# Patient Record
Sex: FEMALE | Race: OTHER | NOT HISPANIC OR LATINO | Employment: FULL TIME | URBAN - METROPOLITAN AREA
[De-identification: names, ages, dates, MRNs, and addresses within clinical notes are randomized per-mention and may not be internally consistent; named-entity substitution may affect disease eponyms.]

---

## 2017-01-17 LAB
T4 FREE SERPL-MCNC: 1.72 NG/DL (ref 0.82–1.77)
THYROGLOB AB SERPL-ACNC: <1 IU/ML
THYROGLOB AB SERPL-ACNC: <1 IU/ML (ref 0–0.9)
THYROGLOB SERPL-MCNC: 0.1 NG/ML
THYROGLOB SERPL-MCNC: NORMAL NG/ML
TSH SERPL DL<=0.005 MIU/L-ACNC: 1.08 UIU/ML (ref 0.45–4.5)

## 2017-02-06 RX ORDER — OMEPRAZOLE 20 MG/1
CAPSULE, DELAYED RELEASE ORAL
Qty: 30 CAP | Refills: 0 | Status: SHIPPED | OUTPATIENT
Start: 2017-02-06 | End: 2022-01-12

## 2017-11-09 LAB
T4 FREE SERPL-MCNC: 1.79 NG/DL (ref 0.82–1.77)
THYROGLOB AB SERPL-ACNC: <1 IU/ML (ref 0–0.9)
THYROGLOB SERPL-MCNC: 0.2 NG/ML (ref 1.5–38.5)
TSH SERPL DL<=0.005 MIU/L-ACNC: 3.58 UIU/ML (ref 0.45–4.5)

## 2019-02-19 ENCOUNTER — OCCUPATIONAL MEDICINE (OUTPATIENT)
Dept: URGENT CARE | Facility: CLINIC | Age: 54
End: 2019-02-19
Payer: OTHER MISCELLANEOUS

## 2019-02-19 VITALS
WEIGHT: 138 LBS | SYSTOLIC BLOOD PRESSURE: 112 MMHG | TEMPERATURE: 99 F | RESPIRATION RATE: 16 BRPM | BODY MASS INDEX: 25.4 KG/M2 | DIASTOLIC BLOOD PRESSURE: 74 MMHG | OXYGEN SATURATION: 98 % | HEIGHT: 62 IN | HEART RATE: 60 BPM

## 2019-02-19 DIAGNOSIS — S09.90XA TRAUMATIC INJURY OF HEAD, INITIAL ENCOUNTER: ICD-10-CM

## 2019-02-19 PROCEDURE — 99213 OFFICE O/P EST LOW 20 MIN: CPT | Mod: 29 | Performed by: PHYSICIAN ASSISTANT

## 2019-02-19 NOTE — LETTER
"EMPLOYEE’S CLAIM FOR COMPENSATION/ REPORT OF INITIAL TREATMENT  FORM C-4    EMPLOYEE’S CLAIM - PROVIDE ALL INFORMATION REQUESTED   First Name  Carlos Last Name  Nikky Birthdate                    1965                Sex  female Claim Number   Home Address  [address] Age  53 y.o. Height  1.562 m (5' 1.5\") Weight  62.6 kg (138 lb) [facility]     [facility] Zip  [address] Telephone  [phone] (home)    Mailing Address  [address] [facility] Zip  [address] Primary Language Spoken  English    Insurer  Unknown Third Party   [employer]   Employee's Occupation (Job Title) When Injury or Occupational Disease Occurred      Employer's Name  [employer]  Telephone  [phone]    Employer Address  [address]  Zip  [address]    Date of Injury  2/19/2019               Hour of Injury  8:20 AM Date Employer Notified  2/19/2019 Last Day of Work after Injury or Occupational Disease  2/19/2019 Supervisor to Whom Injury Reported  Shannon   Address or Location of Accident (if applicable)  [[address]]   What were you doing at the time of accident? (if applicable)  Scanning boxes from pallet    How did this injury or occupational disease occur? (Be specific an answer in detail. Use additional sheet if necessary)  While unwrapping a pallet full of boxes, a box weighing approximatey 20lbs fell on my head.   If you believe that you have an occupational disease, when did you first have knowledge of the disability and it relationship to your employment?  na Witnesses to the Accident  Mikhail Lucas Christian, Sandra      Nature of Injury or Occupational Disease  Defer  Part(s) of Body Injured or Affected  Skull, ,     I certify that the above is true and correct to the best of my knowledge and that I have provided this information in order to obtain the benefits of [place]’s " [place]  I hereby authorize any physician, chiropractor, surgeon, practitioner, or other person, any hospital, including [facility] or [facility], any medical service organization, any insurance company, or other institution or organization to release to each other, any medical or other information, including benefits paid or payable, pertinent to this injury or disease, except information relative to diagnosis, treatment and/or counseling for AIDS, psychological conditions, alcohol or controlled substances, for which I must give specific authorization.  A Photostat of this authorization shall be as valid as the original.     Date   Place   Employee’s Signature   THIS REPORT MUST BE COMPLETED AND MAILED WITHIN 3 WORKING DAYS OF TREATMENT   Place  [facility]  Name of Facility  [facility]   Date  2/19/2019 Diagnosis  (S09.90XA) Traumatic injury of head, initial encounter Is there evidence the injured employee was under the influence of alcohol and/or another controlled substance at the time of accident?   Hour  11:34 AM Description of Injury or Disease  The encounter diagnosis was Traumatic injury of head, initial encounter. No   Treatment  Head injury, no loss of consciousness.  Neuro exam normal, vital signs normal, no red flag symptoms.  OTC meds and conservative measures as discussed  Follow-up 1 week, sooner if new symptoms arise  Have you advised the patient to remain off work five days or more? No   X-Ray Findings      If Yes   From Date  To Date      From information given by the employee, together with medical evidence, can you directly connect this injury or occupational disease as job incurred?  Yes If No Full Duty  No Modified Duty  Yes   Is additional medical care by a physician indicated?  Yes If Modified Duty, Specify any Limitations / Restrictions  Weight limit 25 pounds    "  Do you know of any previous injury or disease contributing to this condition or occupational disease?                            No   Date  2/19/2019 Print Doctor’s Name Manjinder Wilde P.A.-C. I certify the employer’s copy of  this form was mailed on:   Address  [address]s [address] Only     [place] Zip  [address]    Provider’s Tax ID Number  [id number] Telephone  Dept: [phone]        ace-MANJINDER Alegria P.A.-C.   e-Signature: Dr. Kyle Steinberg, Medical Director Degree  CRISTIAN        ORIGINAL-TREATING PHYSICIAN OR CHIROPRACTOR    PAGE 2-INSURER/TPA    PAGE 3-EMPLOYER    PAGE 4-EMPLOYEE             Form C-4 (rev10/07)              BRIEF DESCRIPTION OF RIGHTS AND BENEFITS  (Pursuant to [place]RS 616C.050)    Notice of Injury or Occupational Disease (Incident Report Form C-1): If an injury or occupational disease (OD) arises out of and in the  course of employment, you must provide written notice to your employer as soon as practicable, but no later than 7 days after the accident or  OD. Your employer shall maintain a sufficient supply of the required forms.    Claim for Compensation (Form C-4): If medical treatment is sought, the form C-4 is available at the place of initial treatment. A completed  \"Claim for Compensation\" (Form C-4) must be filed within 90 days after an accident or OD. The treating physician or chiropractor must,  within 3 working days after treatment, complete and mail to the employer, the employer's insurer and third-party , the Claim for  Compensation.    Medical Treatment: If you require medical treatment for your on-the-job injury or OD, you may be required to select a physician or  chiropractor from a list provided by your workers’ compensation insurer, if it has contracted with an Organization for Managed Care (MCO) or  Preferred Provider Organization (PPO) or providers of health care. If your employer has not entered into a contract with an MCO or " PPO, you  may select a physician or chiropractor from the Panel of Physicians and Chiropractors. Any medical costs related to your industrial injury or  OD will be paid by your insurer.    Temporary Total Disability (TTD): If your doctor has certified that you are unable to work for a period of at least 5 consecutive days, or 5  cumulative days in a 20-day period, or places restrictions on you that your employer does not accommodate, you may be entitled to TTD  compensation.    Temporary Partial Disability (TPD): If the wage you receive upon reemployment is less than the compensation for TTD to which you are  entitled, the insurer may be required to pay you TPD compensation to make up the difference. TPD can only be paid for a maximum of 24  months.    Permanent Partial Disability (PPD): When your medical condition is stable and there is an indication of a PPD as a result of your injury or  OD, within 30 days, your insurer must arrange for an evaluation by a rating physician or chiropractor to determine the degree of your PPD. The  amount of your PPD award depends on the date of injury, the results of the PPD evaluation and your age and wage.    Permanent Total Disability (PTD): If you are medically certified by a treating physician or chiropractor as permanently and totally disabled  and have been granted a PTD status by your insurer, you are entitled to receive monthly benefits not to exceed 66 2/3% of your average  monthly wage. The amount of your PTD payments is subject to reduction if you previously received a PPD award.    Vocational Rehabilitation Services: You may be eligible for vocational rehabilitation services if you are unable to return to the job due to a  permanent physical impairment or permanent restrictions as a result of your injury or occupational disease.    Transportation and Per Umu Reimbursement: You may be eligible for travel expenses and per umu associated with medical  treatment.    Reopening: You may be able to reopen your claim if your condition worsens after claim closure.    Appeal Process: If you disagree with a written determination issued by the insurer or the insurer does not respond to your request, you may  appeal to the Department of Administration, , by following the instructions contained in your determination letter. You must  appeal the determination within 70 days from the date of the determination letter at [address], [address], [place], [place]  [address], or [address], [place], [place] [address]. If you disagree with the  decision, you may appeal to the  Department of Administration, . You must file your appeal within 30 days from the date of the  decision  letter at [address], [address], [place], [place] [address], or [address], [place], [place] [address]. If you  disagree with a decision of an , you may file a petition for judicial review with the District Court. You must do so within 30  days of the Appeal Officer’s decision. You may be represented by an  at your own expense or you may contact the [facility] for possible  representation.    [place]  for Injured Workers (NAIW): If you disagree with a  decision, you may request that NAIW represent you  without charge at an  Hearing. For information regarding denial of benefits, you may contact the [facility] at: [address], [address], [place] [address], [phone], or [address], [address], [place] [address], [phone]    To File a Complaint with the Division: If you wish to file a complaint with the  of the Division of Industrial Relations (DIR),  please contact the Workers’ Compensation Section, [address], [address], [place], [place] [address], telephone [phone]) [phone], or  [address], [address], [place], [place] [address], telephone [phone].    For assistance with Workers’ Compensation Issues: you may contact the Office of the Governor Consumer Health Assistance, [address] [place] [address], [address], [place], [place] [address], Toll Free 1-457.779.7556, Web site: http://[employer].[facility].nv., E-mail  Flori@[facility].nv.                                                                                                                                                                                                                                   __________________________________________________________________                                                                   _________________                Employee Name / Signature                                                                                                                                                       Date                                                                                                                                                                                                     D-2 (rev. 10/07)

## 2019-02-19 NOTE — PROGRESS NOTES
"Subjective:      Carlos Sher is a 53 y.o. female who presents with Head Injury (NEW WC DOI 2/19/2019. A box fell on her head.)      DOI: 2/19/19. Was unloading a pallet and a 10-15 lb box fell on top of head. Felt a tingling sensation into face, resolved. No LOC. Used ice, no medications. Feels some pressure on top of head. Denies vision changes, dizziness, numbness/tingling, no coordination issues, neck pain. No previous injuries. No 2nd job.     Head Injury          ROS       Objective:     /74 (BP Location: Left arm, Patient Position: Sitting, BP Cuff Size: Adult)   Pulse 60   Temp 37.2 °C (99 °F) (Temporal)   Resp 16   Ht 1.562 m (5' 1.5\")   Wt 62.6 kg (138 lb)   SpO2 98%   BMI 25.65 kg/m²      Physical Exam   Constitutional: She is oriented to person, place, and time. She appears well-developed and well-nourished. No distress.   HENT:   Head: Normocephalic and atraumatic.   Right Ear: External ear normal.   Left Ear: External ear normal.   Nose: Nose normal.   Eyes: Pupils are equal, round, and reactive to light. Conjunctivae and EOM are normal.   Neck: Normal range of motion. Neck supple.   Neurological: She is alert and oriented to person, place, and time. She has normal strength. She displays normal reflexes. No cranial nerve deficit or sensory deficit. She exhibits normal muscle tone. Coordination and gait normal.   Skin: Skin is warm and dry. She is not diaphoretic.   Psychiatric: She has a normal mood and affect. Her speech is normal and behavior is normal. Judgment and thought content normal. Cognition and memory are normal.   Nursing note and vitals reviewed.              Assessment/Plan:     1. Traumatic injury of head, initial encounter       No loss of consciousness.  Held headache otherwise asymptomatic.  No red flag symptoms.  Vital signs normal, complete neuro exam within normal limits.  No ataxia or coordination issues.  No memory loss.  Mild head injury.  Follow-up 1 week, return " immediately for any new or changing symptoms.  OTC meds and conservative measures as discussed    Return to clinic or go to ED if symptoms worsen or persist. Indications for ED discussed at length. Patient voices understanding. Red flags discussed.All side effects of medication discussed including allergic response, GI upset, tendon injury, etc.    Please note that this dictation was created using voice recognition software. I have made every reasonable attempt to correct obvious errors, but I expect that there are errors of grammar and possibly content that I did not discover before finalizing the note.

## 2019-02-19 NOTE — LETTER
[facility] [address] [place] [address]  Phone:  [phone] - Fax:  [phone]   [facility] Progress Report and Disability Certification  Date of Service: 2/19/2019   No Show:  No  Date / Time of Next Visit: 2/27/2019@11:00am   Claim Information   Patient Name: Carlos Sher  Claim Number:     Employer: [employer]  Date of Injury: 2/19/2019     Insurer / TPA: [employer]  ID / SSN:     Occupation:   Diagnosis: The encounter diagnosis was Traumatic injury of head, initial encounter.    Medical Information   Related to Industrial Injury? Yes    Subjective Complaints:  DOI: 2/19/19. Was unloading a pallet and a 10-15 lb box fell on top of head. Felt a tingling sensation into face, resolved. No LOC. Used ice, no medications. Feels some pressure on top of head. Denies nausea/vomiting, vision changes, dizziness, numbness/tingling, no coordination issues, neck pain. No previous injuries. No 2nd job.   Objective Findings: Constitutional: She is oriented to person, place, and time. She appears well-developed and well-nourished. No distress.   Head: Normocephalic and atraumatic no hematoma or abrasion seen.  TMs clear bilaterally, canal and external ear unchanged.  Nose: Nose normal. Eyes: Pupils are equal, round, and reactive to light. Conjunctivae and EOM are normal.   Neck: Normal range of motion. Neck supple.   Neurological: She is alert and oriented to person, place, and time. She has normal strength. She displays normal reflexes. No cranial nerve deficit or sensory deficit. She exhibits normal muscle tone. Coordination and gait normal.   Skin: Skin is warm and dry. She is not diaphoretic.   Psychiatric: She has a normal mood and affect. Her speech is normal and behavior is normal. Judgment and thought content normal. Cognition and memory are normal.   Nursing note and vitals reviewed.   Pre-Existing Condition(s): None   Assessment:   Initial  Visit    Status: Additional Care Required  Permanent Disability:No    Plan:   Comments:OTC as needed    Diagnostics:      Comments:       Disability Information   Status: Released to Restricted Duty    From:  2/19/2019  Through: 2/27/2019 Restrictions are: Temporary   Physical Restrictions   Sitting:    Standing:    Stooping:    Bending:      Squatting:    Walking:    Climbing:    Pushing:      Pulling:    Other:    Reaching Above Shoulder (L):   Reaching Above Shoulder (R):       Reaching Below Shoulder (L):    Reaching Below Shoulder (R):      Not to exceed Weight Limits   Carrying(hrs):   Weight Limit(lb): < or = to 25 pounds Lifting(hrs):   Weight  Limit(lb): < or = to 25 pounds   Comments: No heavy lifting    Repetitive Actions   Hands: i.e. Fine Manipulations from Grasping:     Feet: i.e. Operating Foot Controls:     Driving / Operate Machinery:     Physician Name: Manjinder Wilde P.A.-C. Physician Signature: MANJINDER Chacko P.A.-C. e-Signature: Dr. Kyle Steinberg, Medical Director   Clinic Name / Location: [address] [address] Clinic Phone Number: Dept: [phone]   Appointment Time: 11:00 Am Visit Start Time: 11:34 AM   Check-In Time:  10:54 Am Visit Discharge Time:  12:30pm   Original-Treating Physician or Chiropractor    Page 2-Insurer/TPA    Page 3-Employer    Page 4-Employee

## 2019-02-27 ENCOUNTER — OCCUPATIONAL MEDICINE (OUTPATIENT)
Dept: OCCUPATIONAL MEDICINE | Facility: CLINIC | Age: 54
End: 2019-02-27
Payer: OTHER MISCELLANEOUS

## 2019-02-27 VITALS
HEART RATE: 68 BPM | HEIGHT: 61 IN | BODY MASS INDEX: 26.06 KG/M2 | OXYGEN SATURATION: 98 % | TEMPERATURE: 97.6 F | WEIGHT: 138 LBS | SYSTOLIC BLOOD PRESSURE: 138 MMHG | DIASTOLIC BLOOD PRESSURE: 80 MMHG

## 2019-02-27 DIAGNOSIS — S09.90XD INJURY OF HEAD, SUBSEQUENT ENCOUNTER: ICD-10-CM

## 2019-02-27 PROCEDURE — 99202 OFFICE O/P NEW SF 15 MIN: CPT | Performed by: PREVENTIVE MEDICINE

## 2019-02-27 RX ORDER — AMLODIPINE BESYLATE 2.5 MG/1
2.5 TABLET ORAL
Refills: 1 | COMMUNITY
Start: 2018-12-18 | End: 2022-01-12

## 2019-02-27 NOTE — PROGRESS NOTES
"Subjective:      Carlos Sher is a 53 y.o. female who presents with Follow-Up (Doi 2/19/19- head- better )      DOI: 2/19/19: 54 yo female presents with head injury. She was unloading a pallet and a 10-15 lb box fell on top of head. No LOC, seen in UC, CT was not deemed necessary.  Patient states overall she feels much improved.  She does have some pain on the left side of her face.  She thinks it may be related more to sinus infection.  Denies headaches, dizziness, lightheadedness, nausea or vomiting.  She has been working without difficulty.     HPI    ROS  ROS: All systems were reviewed on intake form, form was reviewed and signed. See scanned documents in media. Pertinent positives and negatives included in HPI.    PMH: No pertinent past medical history to this problem  MEDS: Medications were reviewed in Epic  ALLERGIES: No Known Allergies  [employer]CH[employer]: Works as a  at [employer]   FH: No pertinent family history to this problem     Objective:     /80   Pulse 68   Temp 36.4 °C (97.6 °F)   Ht 1.549 m (5' 1\")   Wt 62.6 kg (138 lb)   SpO2 98%   BMI 26.07 kg/m²      Physical Exam   Constitutional: She appears well-developed and well-nourished.   Cardiovascular: Normal rate.    Pulmonary/Chest: Effort normal.   Skin: Skin is warm and dry.   Psychiatric: She has a normal mood and affect. Judgment normal.       Neuro: Alert and oriented x3.  PERRLA.  EOMI.  Cranial nerves grossly intact.  HEENT: Postnasal drip.  No tonsillar exudate.  Minimal tenderness over the maxillary sinus on the left.       Assessment/Plan:     1. Injury of head, subsequent encounter  Released from care  Full duty  Follow-up as needed      "

## 2019-02-27 NOTE — LETTER
[address],   [address] [place] [address]  Phone:  [phone] - Fax:  [phone]   [facility] Progress Report and Disability Certification  Date of Service: 2/27/2019   No Show:  No  Date / Time of Next Visit:  Release from care   Claim Information   Patient Name: Carlos Sher  Claim Number:     Employer: [employer]  Date of Injury: 2/19/2019     Insurer / TPA: [facility] Work[employer]  ID / SSN:     Occupation:   Diagnosis: The encounter diagnosis was Injury of head, subsequent encounter.    Medical Information   Related to Industrial Injury? Yes    Subjective Complaints:  DOI: 2/19/19: 54 yo female presents with head injury. She was unloading a pallet and a 10-15 lb box fell on top of head. No LOC, seen in UC, CT was not deemed necessary.  Patient states overall she feels much improved.  She does have some pain on the left side of her face.  She thinks it may be related more to sinus infection.  Denies headaches, dizziness, lightheadedness, nausea or vomiting.  She has been working without difficulty.   Objective Findings: Neuro: Alert and oriented x3.  PERRLA.  EOMI.  Cranial nerves grossly intact.  HEENT: Postnasal drip.  No tonsillar exudate.  Minimal tenderness over the maxillary sinus on the left.   Pre-Existing Condition(s):     Assessment:   Condition Improved    Status: Discharged /  MMI  Permanent Disability:No    Plan:      Diagnostics:      Comments:  Released from care  Full duty  Follow-up as needed    Disability Information   Status: Released to Full Duty    From:  2/27/2019  Through:   Restrictions are:     Physical Restrictions   Sitting:    Standing:    Stooping:    Bending:      Squatting:    Walking:    Climbing:    Pushing:      Pulling:    Other:    Reaching Above Shoulder (L):   Reaching Above Shoulder (R):       Reaching Below Shoulder (L):    Reaching Below Shoulder (R):      Not to exceed Weight Limits      Carrying(hrs):   Weight Limit(lb):   Lifting(hrs):   Weight  Limit(lb):     Comments:      Repetitive Actions   Hands: i.e. Fine Manipulations from Grasping:     Feet: i.e. Operating Foot Controls:     Driving / Operate Machinery:     Physician Name: Kain Akhtar D.O. Physician Signature: [email or web address] GAVIRIA D.O. e-Signature: Dr. Kyle Steinberg, Medical Director   Clinic Name / Location: [address],   [address] [address] Clinic Phone Number: Dept: [phone]   Appointment Time: 11:00 Am Visit Start Time: 11:08 AM   Check-In Time:  11:02 Am Visit Discharge Time:  11:24AM   Original-Treating Physician or Chiropractor    Page 2-Insurer/TPA    Page 3-Employer    Page 4-Employee

## 2021-02-10 ENCOUNTER — OFFICE VISIT (OUTPATIENT)
Dept: URGENT CARE | Facility: CLINIC | Age: 56
End: 2021-02-10
Payer: COMMERCIAL

## 2021-02-10 VITALS
SYSTOLIC BLOOD PRESSURE: 130 MMHG | HEART RATE: 79 BPM | DIASTOLIC BLOOD PRESSURE: 80 MMHG | WEIGHT: 132 LBS | OXYGEN SATURATION: 99 % | BODY MASS INDEX: 24.29 KG/M2 | RESPIRATION RATE: 16 BRPM | TEMPERATURE: 97.5 F | HEIGHT: 62 IN

## 2021-02-10 DIAGNOSIS — S61.012A LACERATION OF LEFT THUMB WITHOUT FOREIGN BODY WITHOUT DAMAGE TO NAIL, INITIAL ENCOUNTER: ICD-10-CM

## 2021-02-10 PROCEDURE — 12001 RPR S/N/AX/GEN/TRNK 2.5CM/<: CPT | Mod: FA | Performed by: NURSE PRACTITIONER

## 2021-02-10 PROCEDURE — 90471 IMMUNIZATION ADMIN: CPT | Performed by: NURSE PRACTITIONER

## 2021-02-10 PROCEDURE — 90715 TDAP VACCINE 7 YRS/> IM: CPT | Performed by: NURSE PRACTITIONER

## 2021-02-11 NOTE — PROGRESS NOTES
Chief Complaint   Patient presents with   • Laceration     cut on left thumb while cooking       HISTORY OF PRESENT ILLNESS: Patient is a 55 y.o. female who presents to urgent care today with complaints of the left thumb laceration.  She was cooking earlier today when she excellently cut her left thumb with a kitchen knife.  She washed the wound out immediately and placed a bandage.  She is here today for treatment.  She is right-hand dominant.  Denies previous injuries to this finger.  She cannot recall her last tetanus booster.    Patient Active Problem List    Diagnosis Date Noted   • Hyperlipidemia LDL goal <130 07/12/2016   • Cough due to ACE inhibitor 07/12/2016   • Seasonal allergies 07/12/2016   • Esophageal reflux 05/30/2016   • Environmental allergies 05/30/2016   • Primary thyroid follicular carcinoma (HCC) 09/02/2011   • Vitamin d deficiency 09/02/2011   • Inflammation of joint of knee 07/26/2011   • Recurrent knee pain 07/26/2011   • Carpal tunnel syndrome 09/24/2010   • Constipation    • Allergic state        Allergies:Patient has no known allergies.    Current Outpatient Medications Ordered in Epic   Medication Sig Dispense Refill   • amlodipine (NORVASC) 5 MG Tab Take 1 Tab by mouth every day. 90 Tab 2   • omeprazole (PRILOSEC) 20 MG delayed-release capsule Take 1 Cap by mouth every day. 90 Cap 0   • levothyroxine (SYNTHROID) 112 MCG Tab Take 112 mcg by mouth Every morning on an empty stomach.     • Cholecalciferol ([employer] VIT D 5000 HIGH-POTENCY) 5000 UNIT CAPS Take 1 Cap by mouth every day.     • Multiple Vitamins-Minerals (QC WOMENS DAILY MULTIVITAMIN PO) Take 1 Tab by mouth every day.     • Iron 45 MG TABS Take 1 Tab by mouth every day.     • amLODIPine (NORVASC) 2.5 MG Tab Take 2.5 mg by mouth every day.  1   • omeprazole (PRILOSEC) 20 MG delayed-release capsule TAKE 1 CAPSULE BY MOUTH EVERY DAY. (Patient not taking: Reported on 2/10/2021) 30 Cap 0   • omeprazole (PRILOSEC) 20 MG delayed-release  "capsule TAKE ONE CAPSULE BY MOUTH TWICE A DAY (Patient not taking: Reported on 2/10/2021) 180 Cap 0     No current Epic-ordered facility-administered medications on file.       Past Medical History:   Diagnosis Date   • Anemia    • Constipation    • Cough due to ACE inhibitor 2016   • GERD (gastroesophageal reflux disease)    • Seasonal allergies    • Thyroid ca (HCC)    • Vitamin d deficiency 2011       Social History     Tobacco Use   • Smoking status: Never Smoker   • Smokeless tobacco: Never Used   Substance Use Topics   • Alcohol use: No   • Drug use: No       Family Status   Relation Name Status   • Mo     • Fa       Family History   Problem Relation Age of Onset   • Hypertension Mother         kidney damage       ROS:  Review of Systems   Constitutional: Negative for fever, chills, weight loss, malaise, and fatigue.   HENT: Negative for ear pain, nosebleeds, congestion, sore throat and neck pain.    Eyes: Negative for vision changes.   Neuro: Negative for headache, sensory changes, weakness, seizure, LOC.   Cardiovascular: Negative for chest pain, palpitations, orthopnea and leg swelling.   Respiratory: Negative for cough, sputum production, shortness of breath and wheezing.   Gastrointestinal: Negative for abdominal pain, nausea, vomiting or diarrhea.   Genitourinary: Negative for dysuria, urgency and frequency.  Musculoskeletal: Negative for falls, neck pain, back pain, joint pain, myalgias.   Skin: Positive for laceration.  Negative for rash, diaphoresis.     Exam:  /80   Pulse 79   Temp 36.4 °C (97.5 °F) (Temporal)   Resp 16   Ht 1.575 m (5' 2\")   Wt 59.9 kg (132 lb)   SpO2 99%   General: well-nourished, well-developed female in NAD  Head: normocephalic, atraumatic  Eyes: PERRLA, no conjunctival injection, acuity grossly intact, lids normal.  Ears: normal shape and symmetry, no tenderness, no discharge. External canals are without any significant edema or " erythema. Tympanic membranes are without any inflammation, no effusion. Gross auditory acuity is intact.  Nose: symmetrical without tenderness, no discharge.  Mouth/Throat: reasonable hygiene, no erythema, exudates or tonsillar enlargement.  Neck: no masses, range of motion within normal limits, no tracheal deviation. No obvious thyroid enlargement.   Lymph: no cervical adenopathy. No supraclavicular adenopathy.   Neuro: alert and oriented. Cranial nerves 1-12 grossly intact. No sensory deficit.   Cardiovascular: regular rate and rhythm. No edema.  Pulmonary: no distress. Chest is symmetrical with respiration, no wheezes, crackles, or rhonchi.   Musculoskeletal: no clubbing, appropriate muscle tone, gait is stable.  Skin: warm, no clubbing, no cyanosis, no rashes.  There is a 2 cm linear laceration to the base of her left thumb, edges well approximated, no foreign bodies.  The thumb has full range of motion, no bony tenderness, strength 5/5, no underlying tendon involvement.  Neurovascular is intact.  Psych: appropriate mood, affect, judgement.       Laceration Repair Procedure Note      Indication: Left thumb laceration     Procedure: Risks including bleeding, nerve damage, infection, and poor cosmetic outcome discussed at length. Benefits and alternatives discussed. The patient was placed in the appropriate position and anesthesia at the base of the digit was obtained by infiltrating with 2% lidocaine without epinephrine.  The area was then cleansed with betadine and draped in a sterile fashion and irrigated with normal saline. The laceration was closed with #5 5-0 Nylon using interrupted sutures with good wound approximation. There were no additional lacerations requiring repair. The wound area was then dressed with bacitracin.       Total repaired wound length: 2cm     Other Items: Suture count: 5     The patient tolerated the procedure well.     Complications: None        Assessment/Plan:  1. Laceration of left  thumb without foreign body without damage to nail, initial encounter  Tdap =>8yo IM         Laceration repaired, patient tolerated well.  Wound care discussed.  Instructed return to clinic in 7 to 10 days for removal.  Tdap updated in clinic.  Supportive care, differential diagnoses, and indications for immediate follow-up discussed with patient.   Pathogenesis of diagnosis discussed including typical length and natural progression.   Instructed to return to clinic or nearest emergency department for any change in condition, further concerns, or worsening of symptoms.  Patient states understanding of the plan of care and discharge instructions.          Please note that this dictation was created using voice recognition software. I have made every reasonable attempt to correct obvious errors, but I expect that there are errors of grammar and possibly content that I did not discover before finalizing the note.      MENDEZ Morrison.

## 2021-03-15 DIAGNOSIS — Z23 NEED FOR VACCINATION: ICD-10-CM

## 2021-03-16 ENCOUNTER — HOSPITAL ENCOUNTER (OUTPATIENT)
Dept: LAB | Facility: MEDICAL CENTER | Age: 56
End: 2021-03-16
Attending: FAMILY MEDICINE
Payer: COMMERCIAL

## 2021-03-16 LAB
ALBUMIN SERPL BCP-MCNC: 4.1 G/DL (ref 3.2–4.9)
ALBUMIN/GLOB SERPL: 1.3 G/DL
ALP SERPL-CCNC: 93 U/L (ref 30–99)
ALT SERPL-CCNC: 20 U/L (ref 2–50)
ANION GAP SERPL CALC-SCNC: 10 MMOL/L (ref 7–16)
AST SERPL-CCNC: 20 U/L (ref 12–45)
BASOPHILS # BLD AUTO: 0.9 % (ref 0–1.8)
BASOPHILS # BLD: 0.07 K/UL (ref 0–0.12)
BILIRUB SERPL-MCNC: 0.5 MG/DL (ref 0.1–1.5)
BUN SERPL-MCNC: 11 MG/DL (ref 8–22)
CALCIUM SERPL-MCNC: 9.2 MG/DL (ref 8.4–10.2)
CHLORIDE SERPL-SCNC: 106 MMOL/L (ref 96–112)
CHOLEST SERPL-MCNC: 206 MG/DL (ref 100–199)
CO2 SERPL-SCNC: 25 MMOL/L (ref 20–33)
CREAT SERPL-MCNC: 0.68 MG/DL (ref 0.5–1.4)
EOSINOPHIL # BLD AUTO: 0.4 K/UL (ref 0–0.51)
EOSINOPHIL NFR BLD: 5.3 % (ref 0–6.9)
ERYTHROCYTE [DISTWIDTH] IN BLOOD BY AUTOMATED COUNT: 35.4 FL (ref 35.9–50)
FASTING STATUS PATIENT QL REPORTED: NORMAL
GLOBULIN SER CALC-MCNC: 3.2 G/DL (ref 1.9–3.5)
GLUCOSE SERPL-MCNC: 115 MG/DL (ref 65–99)
HCT VFR BLD AUTO: 36.6 % (ref 37–47)
HDLC SERPL-MCNC: 55 MG/DL
HGB BLD-MCNC: 11.7 G/DL (ref 12–16)
IMM GRANULOCYTES # BLD AUTO: 0.02 K/UL (ref 0–0.11)
IMM GRANULOCYTES NFR BLD AUTO: 0.3 % (ref 0–0.9)
LDLC SERPL CALC-MCNC: 122 MG/DL
LYMPHOCYTES # BLD AUTO: 2.49 K/UL (ref 1–4.8)
LYMPHOCYTES NFR BLD: 33.1 % (ref 22–41)
MCH RBC QN AUTO: 24.2 PG (ref 27–33)
MCHC RBC AUTO-ENTMCNC: 32 G/DL (ref 33.6–35)
MCV RBC AUTO: 75.6 FL (ref 81.4–97.8)
MONOCYTES # BLD AUTO: 0.48 K/UL (ref 0–0.85)
MONOCYTES NFR BLD AUTO: 6.4 % (ref 0–13.4)
NEUTROPHILS # BLD AUTO: 4.07 K/UL (ref 2–7.15)
NEUTROPHILS NFR BLD: 54 % (ref 44–72)
NRBC # BLD AUTO: 0 K/UL
NRBC BLD-RTO: 0 /100 WBC
PLATELET # BLD AUTO: 299 K/UL (ref 164–446)
PMV BLD AUTO: 9.5 FL (ref 9–12.9)
POTASSIUM SERPL-SCNC: 3.5 MMOL/L (ref 3.6–5.5)
PROT SERPL-MCNC: 7.3 G/DL (ref 6–8.2)
RBC # BLD AUTO: 4.84 M/UL (ref 4.2–5.4)
SODIUM SERPL-SCNC: 141 MMOL/L (ref 135–145)
TRIGL SERPL-MCNC: 146 MG/DL (ref 0–149)
TSH SERPL DL<=0.005 MIU/L-ACNC: 0.22 UIU/ML (ref 0.38–5.33)
WBC # BLD AUTO: 7.5 K/UL (ref 4.8–10.8)

## 2021-03-16 PROCEDURE — 80053 COMPREHEN METABOLIC PANEL: CPT

## 2021-03-16 PROCEDURE — 80061 LIPID PANEL: CPT

## 2021-03-16 PROCEDURE — 36415 COLL VENOUS BLD VENIPUNCTURE: CPT

## 2021-03-16 PROCEDURE — 84443 ASSAY THYROID STIM HORMONE: CPT

## 2021-03-16 PROCEDURE — 85025 COMPLETE CBC W/AUTO DIFF WBC: CPT

## 2021-12-16 ENCOUNTER — OFFICE VISIT (OUTPATIENT)
Dept: URGENT CARE | Facility: CLINIC | Age: 56
End: 2021-12-16
Payer: COMMERCIAL

## 2021-12-16 ENCOUNTER — HOSPITAL ENCOUNTER (OUTPATIENT)
Facility: MEDICAL CENTER | Age: 56
End: 2021-12-16
Attending: PHYSICIAN ASSISTANT
Payer: COMMERCIAL

## 2021-12-16 VITALS
TEMPERATURE: 97.4 F | RESPIRATION RATE: 16 BRPM | SYSTOLIC BLOOD PRESSURE: 110 MMHG | HEIGHT: 61 IN | OXYGEN SATURATION: 97 % | BODY MASS INDEX: 26.06 KG/M2 | DIASTOLIC BLOOD PRESSURE: 80 MMHG | HEART RATE: 82 BPM | WEIGHT: 138 LBS

## 2021-12-16 DIAGNOSIS — J06.9 VIRAL URI: ICD-10-CM

## 2021-12-16 LAB
EXTERNAL QUALITY CONTROL: NORMAL
SARS-COV+SARS-COV-2 AG RESP QL IA.RAPID: NEGATIVE

## 2021-12-16 PROCEDURE — 87426 SARSCOV CORONAVIRUS AG IA: CPT | Performed by: PHYSICIAN ASSISTANT

## 2021-12-16 PROCEDURE — 99213 OFFICE O/P EST LOW 20 MIN: CPT | Performed by: PHYSICIAN ASSISTANT

## 2021-12-16 PROCEDURE — U0003 INFECTIOUS AGENT DETECTION BY NUCLEIC ACID (DNA OR RNA); SEVERE ACUTE RESPIRATORY SYNDROME CORONAVIRUS 2 (SARS-COV-2) (CORONAVIRUS DISEASE [COVID-19]), AMPLIFIED PROBE TECHNIQUE, MAKING USE OF HIGH THROUGHPUT TECHNOLOGIES AS DESCRIBED BY CMS-2020-01-R: HCPCS

## 2021-12-16 PROCEDURE — U0005 INFEC AGEN DETEC AMPLI PROBE: HCPCS

## 2021-12-16 ASSESSMENT — ENCOUNTER SYMPTOMS
SPUTUM PRODUCTION: 0
DIARRHEA: 0
NAUSEA: 0
DIZZINESS: 0
DIAPHORESIS: 0
COUGH: 1
CHILLS: 0
SHORTNESS OF BREATH: 0
PALPITATIONS: 0
MYALGIAS: 0
FEVER: 1
SINUS PAIN: 0
HEADACHES: 0
ABDOMINAL PAIN: 0
VOMITING: 0
WHEEZING: 0
SORE THROAT: 1

## 2021-12-16 ASSESSMENT — FIBROSIS 4 INDEX: FIB4 SCORE: 0.85

## 2021-12-16 NOTE — PROGRESS NOTES
Subjective:   Carlos Sher is a 56 y.o. female who presents for Cough (x 3 days, cough, sratchy throat and fever.)      HPI:  This is a very pleasant 56-year-old female presenting to the clinic with upper respiratory infection-like symptoms x3 days.  Patient states she has mild sinus congestion.  She also has a cough that is dry nonproductive.  No associated shortness of breath or chest pain.  Mild intermittent sore throat.  No difficulty swallowing or handling secretions.  She has also been running a low-grade fever with a T-max of 100 °F via temporal thermometer.  Has been taking Tylenol as needed for fever.  Last took Tylenol around 11 AM this morning.  Family members have been sick with similar symptoms and are currently recovering.  They all tested negative for COVID-19.  Patient has been vaccinated for COVID-19.    Review of Systems   Constitutional: Positive for fever. Negative for chills, diaphoresis and malaise/fatigue.   HENT: Positive for congestion and sore throat (scratchy). Negative for ear pain and sinus pain.    Respiratory: Positive for cough. Negative for sputum production, shortness of breath and wheezing.    Cardiovascular: Negative for chest pain and palpitations.   Gastrointestinal: Negative for abdominal pain, diarrhea, nausea and vomiting.   Musculoskeletal: Negative for myalgias.   Neurological: Negative for dizziness and headaches.   Endo/Heme/Allergies: Negative for environmental allergies.       Medications:    • amLODIPine Tabs  • cholecalciferol Caps  • Iron Tabs  • levothyroxine Tabs  • MELATONIN PO  • omeprazole  • QC WOMENS DAILY MULTIVITAMIN PO    Allergies: Patient has no known allergies.    Problem List: Carlos Sher does not have any pertinent problems on file.    Surgical History:  Past Surgical History:   Procedure Laterality Date   • THYROIDECTOMY TOTAL  4/12/10    Thyroid cancer, followed by radiation.       Past Social Hx: Carlos Sher  reports that she has never  "smoked. She has never used smokeless tobacco. She reports that she does not drink alcohol and does not use drugs.     Past Family Hx:  Carlos Sher family history includes Hypertension in her mother.     Problem list, medications, and allergies reviewed by myself today in Epic.     Objective:     /80 (BP Location: Left arm, Patient Position: Sitting, BP Cuff Size: Adult)   Pulse 82   Temp 36.3 °C (97.4 °F) (Temporal)   Resp 16   Ht 1.549 m (5' 1\")   Wt 62.6 kg (138 lb)   SpO2 97%   BMI 26.07 kg/m²     Physical Exam  Constitutional:       General: She is not in acute distress.     Appearance: Normal appearance. She is not ill-appearing, toxic-appearing or diaphoretic.   HENT:      Head: Normocephalic and atraumatic.      Right Ear: Tympanic membrane, ear canal and external ear normal.      Left Ear: Tympanic membrane, ear canal and external ear normal.      Nose: Nose normal. No congestion or rhinorrhea.      Mouth/Throat:      Mouth: Mucous membranes are moist.      Pharynx: No oropharyngeal exudate or posterior oropharyngeal erythema.   Eyes:      Conjunctiva/sclera: Conjunctivae normal.   Cardiovascular:      Rate and Rhythm: Normal rate and regular rhythm.      Pulses: Normal pulses.      Heart sounds: Normal heart sounds.   Pulmonary:      Effort: Pulmonary effort is normal.      Breath sounds: Normal breath sounds. No wheezing.   Musculoskeletal:      Cervical back: Normal range of motion. No muscular tenderness.   Lymphadenopathy:      Cervical: No cervical adenopathy.   Skin:     General: Skin is warm and dry.      Capillary Refill: Capillary refill takes less than 2 seconds.   Neurological:      Mental Status: She is alert.   Psychiatric:         Mood and Affect: Mood normal.         Thought Content: Thought content normal.       POCT Covid: Negative    Assessment/Plan:     Comments/MDM:     Discussed viral etiology of URI.     Symptomatic care.  Oral hydration and rest.   Over the counter " expectorant as directed; Guaifenesin (Mucinex).  Diphenhydramine as directed for rhinorrhea (runny nose) and sneezing.  May take over the counter pseudoephedrine for nasal congestion. Can increase your blood pressure.  Tylenol or ibuprofen for pain and fever as directed.   Saline nasal spray as a decongestant.  Pending PCR Covid result.  Isolation precautions discussed.  [facility] next 24 to 36 hours.  Follow up for shortness of breath, fevers, elevated heart rate, weakness, prolonged cough, chest pain, or any other concerns.     Diagnosis and associated orders:     1. Viral URI  POCT SARS-COV Antigen PILAR (Symptomatic Only)    COVID/SARS CoV-2 PCR            Differential diagnosis, natural history, supportive care, and indications for immediate follow-up discussed.    I personally reviewed prior external notes and test results pertinent to today's visit.     Advised the patient to follow-up with the primary care physician for recheck, reevaluation, and consideration of further management.    Please note that this dictation was created using voice recognition software. I have made reasonable attempt to correct obvious errors, but I expect that there are errors of grammar and possibly content that I did not discover before finalizing the note.    This note was electronically signed by SATINDER Barksdale PA-C

## 2021-12-17 LAB
COVID ORDER STATUS COVID19: NORMAL
SARS-COV-2 RNA RESP QL NAA+PROBE: NOTDETECTED
SPECIMEN SOURCE: NORMAL

## 2022-01-12 ENCOUNTER — OFFICE VISIT (OUTPATIENT)
Dept: INTERNAL MEDICINE | Facility: OTHER | Age: 57
End: 2022-01-12
Payer: COMMERCIAL

## 2022-01-12 VITALS
WEIGHT: 141.6 LBS | TEMPERATURE: 98.5 F | BODY MASS INDEX: 26.73 KG/M2 | HEIGHT: 61 IN | SYSTOLIC BLOOD PRESSURE: 130 MMHG | DIASTOLIC BLOOD PRESSURE: 64 MMHG | OXYGEN SATURATION: 99 % | HEART RATE: 70 BPM

## 2022-01-12 DIAGNOSIS — C73 PRIMARY THYROID FOLLICULAR CARCINOMA (HCC): ICD-10-CM

## 2022-01-12 DIAGNOSIS — I10 ESSENTIAL HYPERTENSION: ICD-10-CM

## 2022-01-12 DIAGNOSIS — K62.5 RECTAL BLEEDING: ICD-10-CM

## 2022-01-12 PROCEDURE — 99214 OFFICE O/P EST MOD 30 MIN: CPT | Mod: GC | Performed by: STUDENT IN AN ORGANIZED HEALTH CARE EDUCATION/TRAINING PROGRAM

## 2022-01-12 RX ORDER — AMLODIPINE BESYLATE 5 MG/1
5 TABLET ORAL DAILY
Qty: 90 TABLET | Refills: 2 | Status: SHIPPED | OUTPATIENT
Start: 2022-01-12

## 2022-01-12 ASSESSMENT — FIBROSIS 4 INDEX: FIB4 SCORE: 0.85

## 2022-01-12 ASSESSMENT — PATIENT HEALTH QUESTIONNAIRE - PHQ9: CLINICAL INTERPRETATION OF PHQ2 SCORE: 0

## 2022-01-12 NOTE — PATIENT INSTRUCTIONS
-check your labs one week before your next appointment.  -call digestive health to see if they can give you an appointment directly, I will place a referal too incase they need it.  -f/u in 3 months

## 2022-01-12 NOTE — PROGRESS NOTES
Carlos Sher is a 56 y.o. female here to establish care     HPI: 56-year-old lady with a past medical history of constipation, primary thyroid follicular carcinoma, hypertension complains of rectal bleeding and pain with defecation for almost 6 to 7 months now.  She was previously seen by family medicine provider at which time anoscopy was done and did not reveal any significant abnormalities but exam was limited secondary to pain.  Patient says she has been having on and off constipation but however over the last few weeks she has been having a daily BM.  Yesterday patient reports noting bright red blood in the toilet after having a bowel movement which caused concern for her and she is here for getting that checked out.  She had a colonoscopy in 2018 which was negative for any abnormalities per the patient.  She has been trying prune juice and tried MiraLAX to help with her constipation.  However last was causing bloating.  She denies any recent weight loss or significant changes in the appetite.  She denies any nausea, vomiting, epigastric pain.  Review of systems is otherwise negative      Current medicines (including changes today)  Current Outpatient Medications   Medication Sig Dispense Refill   • Naproxen Sodium (ALEVE PO) Take  by mouth.     • amLODIPine (NORVASC) 5 MG Tab Take 1 Tablet by mouth every day. 90 Tablet 2   • MELATONIN PO Take  by mouth.     • levothyroxine (SYNTHROID) 112 MCG Tab Take 112 mcg by mouth Every morning on an empty stomach.     • Cholecalciferol ([employer]S VIT D 5000 HIGH-POTENCY) 5000 UNIT CAPS Take 1 Cap by mouth every day.       No current facility-administered medications for this visit.     She  has a past medical history of Anemia, Constipation (2008), Cough due to ACE inhibitor (7/12/2016), GERD (gastroesophageal reflux disease), Seasonal allergies, Thyroid ca (HCC) (2010), and Vitamin d deficiency (9/2/2011).  She  has a past surgical history that includes thyroidectomy total  "(4/12/10).  Social History     Tobacco Use   • Smoking status: Never Smoker   • Smokeless tobacco: Never Used   Substance Use Topics   • Alcohol use: No   • Drug use: No     Social History     Social History Narrative   • Not on file     Family History   Problem Relation Age of Onset   • Hypertension Mother         kidney damage     Family Status   Relation Name Status   • Mo     • Fa         ROS  See HPI     Objective:     Physical Exam:  /64 (BP Location: Right arm, Patient Position: Sitting, BP Cuff Size: Adult)   Pulse 70   Temp 36.9 °C (98.5 °F) (Temporal)   Ht 1.549 m (5' 1\")   Wt 64.2 kg (141 lb 9.6 oz)   SpO2 99%  Body mass index is 26.76 kg/m².  Constitutional: Alert. Well appearing. No distress.  Skin: Warm, dry, good turgor, no visible rashes.  Eye: Equal, round and reactive to light, conjunctiva clear, lids normal.  ENMT: Moist mucous membranes. Normal dentition. Oropharynx clear.  Neck: Trachea midline, no masses, no thyromegaly. No cervical or supraclavicular lymphadenopathy.  Respiratory: Normal effort. Lungs are clear to auscultation bilaterally.  Cardiovascular: Regular rate and rhythm. Normal S1/S2. No murmurs, rubs or gallops.   Abdomen: Soft, non-tender, non-distended. No masses, no hepatosplenomegaly.  Rectal exam: No external hemorrhoids or visible anal fissures, no joe blood appreciated.  Possible internal hemorrhoids.  Neuro: Moves all four extremities. No facial droop.  Psych: Answers questions appropriately. Normal affect and mood.      Assessment and Plan:     1. Rectal bleeding  No active bleeding, patient vitals are stable, 2018 colonoscopy has been negative.  Potential he having some internal hemorrhoids  - Referral to Gastroenterology for flexible sigmoidoscopy  - CBC WITHOUT DIFFERENTIAL; Future    2. Primary thyroid follicular carcinoma (HCC)  Patient is going to follow-up with Dr. Funez  -Currently on levothyroxine 112(1 tablet 5 times a week and half " twice a week)    3. Essential hypertension  Blood pressure with systolic in the 130s, no symptoms  -Continue amlodipine 5, refills provided  -Check lab work prior to next visit    - Comp Metabolic Panel; Future  - amLODIPine (NORVASC) 5 MG Tab; Take 1 Tablet by mouth every day.  Dispense: 90 Tablet; Refill: 2      Records requested from previous PCP.  Follow up: Return in about 3 months (around 4/12/2022).         PLEASE NOTE: This note was created using voice recognition software.

## 2022-04-04 ENCOUNTER — TELEPHONE (OUTPATIENT)
Dept: INTERNAL MEDICINE | Facility: OTHER | Age: 57
End: 2022-04-04
Payer: COMMERCIAL

## 2022-04-05 ENCOUNTER — TELEPHONE (OUTPATIENT)
Dept: HOSPITALIST | Facility: MEDICAL CENTER | Age: 57
End: 2022-04-05
Payer: COMMERCIAL

## 2022-04-05 DIAGNOSIS — M25.569 RECURRENT KNEE PAIN, UNSPECIFIED LATERALITY: ICD-10-CM

## 2022-04-05 NOTE — TELEPHONE ENCOUNTER
Demetria Crisostomo M.D. 1 hour ago (6:16 AM)     AD       Vitamin d order placed, please send over to patient.         Documentation

## 2022-05-31 ENCOUNTER — OFFICE VISIT (OUTPATIENT)
Dept: INTERNAL MEDICINE | Facility: OTHER | Age: 57
End: 2022-05-31
Payer: COMMERCIAL

## 2022-05-31 VITALS
HEIGHT: 61 IN | BODY MASS INDEX: 27.08 KG/M2 | DIASTOLIC BLOOD PRESSURE: 85 MMHG | WEIGHT: 143.4 LBS | OXYGEN SATURATION: 97 % | TEMPERATURE: 97.5 F | SYSTOLIC BLOOD PRESSURE: 141 MMHG | HEART RATE: 67 BPM

## 2022-05-31 DIAGNOSIS — M79.662 PAIN AND SWELLING OF LEFT LOWER LEG: ICD-10-CM

## 2022-05-31 DIAGNOSIS — M79.89 PAIN AND SWELLING OF LEFT LOWER LEG: ICD-10-CM

## 2022-05-31 DIAGNOSIS — M25.562 LEFT KNEE PAIN, UNSPECIFIED CHRONICITY: ICD-10-CM

## 2022-05-31 PROCEDURE — 99214 OFFICE O/P EST MOD 30 MIN: CPT | Mod: GC | Performed by: STUDENT IN AN ORGANIZED HEALTH CARE EDUCATION/TRAINING PROGRAM

## 2022-05-31 RX ORDER — ESTRADIOL 10 UG/1
10 INSERT VAGINAL
COMMUNITY

## 2022-05-31 ASSESSMENT — FIBROSIS 4 INDEX: FIB4 SCORE: 0.83

## 2022-05-31 NOTE — PROGRESS NOTES
Carlos Sher is a 56 y.o. female with past medical history of follicular thyroid cancer who follows with endocrinology, hypertension, dyslipidemia presents today with complaints of left knee pain with swelling of the left lower leg since 7 weeks.  She endorses travel to [place] 3 weeks prior to onset of the symptoms.  She also states that she feels that the pain originates in the left hip and radiates down to the left knee.  She also noticed associated swelling of the left ankle during this time.  Patient reports ice/heat back help but it has been progressively getting worse.  She does not recall any trauma or lifting heavy weights.  No fevers or erythema associated with this.  Review of systems otherwise negative        Current medicines (including changes today)  Current Outpatient Medications   Medication Sig Dispense Refill   • Docusate Sodium (COLACE PO) Take  by mouth.     • estradiol (YUVAFEM) 10 MCG Tab Insert 10 mcg into the vagina every 7 days.     • diclofenac sodium (VOLTAREN) 1 % Gel Apply 4 g topically 4 times a day as needed (knee pain). 100 g 0   • amLODIPine (NORVASC) 5 MG Tab Take 1 Tablet by mouth every day. 90 Tablet 2   • MELATONIN PO Take  by mouth.     • levothyroxine (SYNTHROID) 112 MCG Tab Take 112 mcg by mouth Every morning on an empty stomach.     • cholecalciferol (VITAMIN D3) 5000 UNIT Cap Take 1 Cap by mouth every day.       No current facility-administered medications for this visit.     She  has a past medical history of Anemia, Constipation (2008), Cough due to ACE inhibitor (7/12/2016), GERD (gastroesophageal reflux disease), Seasonal allergies, Thyroid ca (HCC) (2010), and Vitamin d deficiency (9/2/2011).  She  has a past surgical history that includes thyroidectomy total (4/12/10).  Social History     Tobacco Use   • Smoking status: Never Smoker   • Smokeless tobacco: Never Used   Substance Use Topics   • Alcohol use: No   • Drug use: No     Social History     Social History  "Narrative   • Not on file     Family History   Problem Relation Age of Onset   • Hypertension Mother         kidney damage     Family Status   Relation Name Status   • Mo     • Fa         ROS  See HPI     Objective:     Physical Exam:  BP (!) 141/85 (BP Location: Left arm, Patient Position: Sitting, BP Cuff Size: Adult)   Pulse 67   Temp 36.4 °C (97.5 °F) (Temporal)   Ht 1.549 m (5' 1\")   Wt 65 kg (143 lb 6.4 oz)   SpO2 97%  Body mass index is 27.1 kg/m².  Constitutional: Alert. Well appearing. No distress.  Skin: Warm, dry, good turgor, no visible rashes.  Eye: Equal, round and reactive to light, conjunctiva clear, lids normal.  ENMT: Moist mucous membranes. Normal dentition. Oropharynx clear.  Neck: Trachea midline, no masses, no thyromegaly. No cervical or supraclavicular lymphadenopathy.  Respiratory: Normal effort. Lungs are clear to auscultation bilaterally.  Cardiovascular: Regular rate and rhythm. Normal S1/S2. No murmurs, rubs or gallops.   Abdomen: Soft, non-tender, non-distended. No masses, no hepatosplenomegaly.  Neuro: Moves all four extremities. No facial droop.  Musculoskeletal: Left extremity seems a slightly swollen compared to the right, swelling around the left ankle noted, no range of motion limitation.  Psych: Answers questions appropriately. Normal affect and mood.      Assessment and Plan:     1. Pain and swelling of left lower leg  Given swelling and pain starting after long distance travel there is high possibility of DVT in the differential.  -We will get an ultrasound of the lower extremity as well as ankle x-ray  - US-EXTREMITY VENOUS LOWER UNILAT LEFT; Future  - DX-ANKLE 3+ VIEWS LEFT; Future    2. Left knee pain, unspecified chronicity  Atraumatic left knee pain, no visible erythema or warmth.  -Will consider x-ray of the left and also recommend Voltaren gel and Tylenol.  If imaging normal.  Will consider physical therapy  -Follow-up in 1 month  - DX-KNEE 3 VIEWS " LEFT; Future      .  Follow up: Return in about 1 month (around 6/30/2022).         PLEASE NOTE: This note was created using voice recognition software.

## 2022-05-31 NOTE — PATIENT INSTRUCTIONS
-get USG leg and xray as discussed.  -continue heat/ice, use tylenol 650 upto 3-4 times a day. Try voltaren gel as needed.  - monitor BP at home, if persistently over 140 let us know and we will increase medication, cut down salt in diet.  -f/u in 1 month.

## 2022-06-01 ENCOUNTER — HOSPITAL ENCOUNTER (OUTPATIENT)
Dept: RADIOLOGY | Facility: MEDICAL CENTER | Age: 57
End: 2022-06-01
Attending: STUDENT IN AN ORGANIZED HEALTH CARE EDUCATION/TRAINING PROGRAM
Payer: COMMERCIAL

## 2022-06-01 ENCOUNTER — TELEPHONE (OUTPATIENT)
Dept: INTERNAL MEDICINE | Facility: OTHER | Age: 57
End: 2022-06-01
Payer: COMMERCIAL

## 2022-06-01 DIAGNOSIS — M79.89 PAIN AND SWELLING OF LEFT LOWER LEG: ICD-10-CM

## 2022-06-01 DIAGNOSIS — M25.562 LEFT KNEE PAIN, UNSPECIFIED CHRONICITY: ICD-10-CM

## 2022-06-01 DIAGNOSIS — M79.662 PAIN AND SWELLING OF LEFT LOWER LEG: ICD-10-CM

## 2022-06-01 PROCEDURE — 93971 EXTREMITY STUDY: CPT | Mod: LT

## 2022-06-01 PROCEDURE — 73562 X-RAY EXAM OF KNEE 3: CPT | Mod: LT

## 2022-06-01 PROCEDURE — 73610 X-RAY EXAM OF ANKLE: CPT | Mod: LT

## 2022-06-01 NOTE — TELEPHONE ENCOUNTER
No evidence of blood clot in leg, Xray no fracture please notify patient. Advice that I will refer to PT.

## 2022-07-20 ENCOUNTER — APPOINTMENT (OUTPATIENT)
Dept: PHYSICAL THERAPY | Facility: REHABILITATION | Age: 57
End: 2022-07-20
Attending: STUDENT IN AN ORGANIZED HEALTH CARE EDUCATION/TRAINING PROGRAM
Payer: COMMERCIAL

## 2022-08-02 ENCOUNTER — OFFICE VISIT (OUTPATIENT)
Dept: URGENT CARE | Facility: CLINIC | Age: 57
End: 2022-08-02
Payer: COMMERCIAL

## 2022-08-02 ENCOUNTER — HOSPITAL ENCOUNTER (OUTPATIENT)
Facility: MEDICAL CENTER | Age: 57
End: 2022-08-02
Attending: STUDENT IN AN ORGANIZED HEALTH CARE EDUCATION/TRAINING PROGRAM
Payer: COMMERCIAL

## 2022-08-02 VITALS
BODY MASS INDEX: 24.66 KG/M2 | RESPIRATION RATE: 16 BRPM | HEIGHT: 62 IN | HEART RATE: 80 BPM | WEIGHT: 134 LBS | OXYGEN SATURATION: 95 % | DIASTOLIC BLOOD PRESSURE: 60 MMHG | TEMPERATURE: 97.6 F | SYSTOLIC BLOOD PRESSURE: 118 MMHG

## 2022-08-02 DIAGNOSIS — U07.1 COVID-19 VIRUS INFECTION: ICD-10-CM

## 2022-08-02 DIAGNOSIS — R50.9 FEVER, UNSPECIFIED FEVER CAUSE: ICD-10-CM

## 2022-08-02 PROCEDURE — U0005 INFEC AGEN DETEC AMPLI PROBE: HCPCS

## 2022-08-02 PROCEDURE — U0003 INFECTIOUS AGENT DETECTION BY NUCLEIC ACID (DNA OR RNA); SEVERE ACUTE RESPIRATORY SYNDROME CORONAVIRUS 2 (SARS-COV-2) (CORONAVIRUS DISEASE [COVID-19]), AMPLIFIED PROBE TECHNIQUE, MAKING USE OF HIGH THROUGHPUT TECHNOLOGIES AS DESCRIBED BY CMS-2020-01-R: HCPCS

## 2022-08-02 PROCEDURE — 99214 OFFICE O/P EST MOD 30 MIN: CPT | Mod: CS | Performed by: STUDENT IN AN ORGANIZED HEALTH CARE EDUCATION/TRAINING PROGRAM

## 2022-08-02 ASSESSMENT — FIBROSIS 4 INDEX: FIB4 SCORE: 0.83

## 2022-08-02 NOTE — PROGRESS NOTES
Subjective:   CHIEF COMPLAINT  Chief Complaint   Patient presents with   • Coronavirus Screening     Cough and fevers x yesterday , tested positive at home x today        HPI  Carlos Sher is a 56 y.o. female here with COVID-19 infection presents complaining of sore throat, cough and fever with a T-max of 100.6.  Symptoms started last night.  She has been taking Tylenol Advil which have helped.  Currently afebrile.  Denies experiencing wheezing or shortness of breath.  She is vaccine against COVID x3.  She is requesting COVID PCR test for her employer.    REVIEW OF SYSTEMS  General: Admits fever and body aches  GI: no nausea or vomiting  See HPI for further details.    PAST MEDICAL HISTORY  Patient Active Problem List    Diagnosis Date Noted   • Hyperlipidemia LDL goal <130 07/12/2016   • Cough due to ACE inhibitor 07/12/2016   • Seasonal allergies 07/12/2016   • Esophageal reflux 05/30/2016   • Environmental allergies 05/30/2016   • Primary thyroid follicular carcinoma (HCC) 09/02/2011   • Vitamin d deficiency 09/02/2011   • Recurrent knee pain 07/26/2011   • Allergic state        SURGICAL HISTORY   has a past surgical history that includes thyroidectomy total (4/12/10).    ALLERGIES  No Known Allergies    CURRENT MEDICATIONS  Home Medications     Reviewed by Socorro Corcoran (Medical Assistant) on 08/02/22 at 1555  Med List Status: <None>   Medication Last Dose Status   amLODIPine (NORVASC) 5 MG Tab Taking Active   cholecalciferol (VITAMIN D3) 5000 UNIT Cap Taking Active   diclofenac sodium (VOLTAREN) 1 % Gel Taking Active   Docusate Sodium (COLACE PO) Taking Active   estradiol (VAGIFEM) 10 MCG Tab Taking Active   levothyroxine (SYNTHROID) 112 MCG Tab Taking Active   MELATONIN PO Taking Active   methylPREDNISolone (MEDROL DOSEPAK) 4 MG Tablet Therapy Pack Not Taking Active                SOCIAL HISTORY  Social History     Tobacco Use   • Smoking status: Never Smoker   • Smokeless tobacco: Never Used  "  Substance and Sexual Activity   • Alcohol use: No   • Drug use: No   • Sexual activity: Not on file       FAMILY HISTORY  Family History   Problem Relation Age of Onset   • Hypertension Mother         kidney damage          Objective:   PHYSICAL EXAM  VITAL SIGNS: /60 (BP Location: Left arm, Patient Position: Sitting, BP Cuff Size: Adult)   Pulse 80   Temp 36.4 °C (97.6 °F) (Temporal)   Resp 16   Ht 1.575 m (5' 2\")   Wt 60.8 kg (134 lb)   SpO2 95%   BMI 24.51 kg/m²     Gen: no acute distress, normal voice  Skin: dry, intact, moist mucosal membranes  Lungs: CTAB w/ symmetric expansion  CV: RRR w/o murmurs or clicks  Psych: normal affect, normal judgement, alert, awake    Assessment/Plan:     1. COVID-19 virus infection  COVID/SARS CoV-2 PCR    Nirmatrelvir & Ritonavir 20 x 150 MG & 10 x 100MG Tablet Therapy Pack   2. Fever, unspecified fever cause     Home COVID test was positive which would explain her symptoms.  She is currently afebrile.  She is vaccine against COVID x3.  She has a PMH of primary thyroid follicular carcinoma and hypertension; PMH makes her high risk for severe COVID therefore I recommended starting on paxlovid.  Risk benefits and alternatives were discussed.  Patient would like to wait a couple more days to see if symptoms improve before starting antiviral medication which is completely reasonable.  Hand written prescription for paxlovid was provided the patient.  She was instructed to begin within 5 days if she decides to do so.  No drug to drug interactions.  She has good kidney function.  COVID PCR test was ordered and results will be sent to [facility].      Differential diagnosis, natural history, supportive care, and indications for immediate follow-up discussed. All questions answered. Patient agrees with the plan of care.    Follow-up as needed if symptoms worsen or fail to improve to PCP, Urgent care or Emergency Room.      Please note that this dictation was created using " voice recognition software. I have made a reasonable attempt to correct obvious errors, but I expect that there are errors of grammar and possibly content that I did not discover before finalizing the note.

## 2022-08-03 DIAGNOSIS — U07.1 COVID-19 VIRUS INFECTION: ICD-10-CM

## 2022-08-03 LAB
COVID ORDER STATUS COVID19: NORMAL
SARS-COV-2 RNA RESP QL NAA+PROBE: DETECTED
SPECIMEN SOURCE: ABNORMAL

## 2022-12-28 ENCOUNTER — OFFICE VISIT (OUTPATIENT)
Dept: URGENT CARE | Facility: CLINIC | Age: 57
End: 2022-12-28
Payer: COMMERCIAL

## 2022-12-28 VITALS
TEMPERATURE: 99 F | RESPIRATION RATE: 16 BRPM | WEIGHT: 140 LBS | HEIGHT: 61 IN | DIASTOLIC BLOOD PRESSURE: 66 MMHG | HEART RATE: 89 BPM | BODY MASS INDEX: 26.43 KG/M2 | OXYGEN SATURATION: 96 % | SYSTOLIC BLOOD PRESSURE: 128 MMHG

## 2022-12-28 DIAGNOSIS — J10.1 INFLUENZA A: ICD-10-CM

## 2022-12-28 LAB
EXTERNAL QUALITY CONTROL: NORMAL
FLUAV+FLUBV AG SPEC QL IA: NORMAL
INT CON NEG: NORMAL
INT CON NEG: NORMAL
INT CON POS: NORMAL
INT CON POS: NORMAL
SARS-COV+SARS-COV-2 AG RESP QL IA.RAPID: NEGATIVE

## 2022-12-28 PROCEDURE — 99213 OFFICE O/P EST LOW 20 MIN: CPT | Performed by: PHYSICIAN ASSISTANT

## 2022-12-28 PROCEDURE — 87426 SARSCOV CORONAVIRUS AG IA: CPT | Performed by: PHYSICIAN ASSISTANT

## 2022-12-28 PROCEDURE — 87804 INFLUENZA ASSAY W/OPTIC: CPT | Performed by: PHYSICIAN ASSISTANT

## 2022-12-28 RX ORDER — BENZONATATE 200 MG/1
200 CAPSULE ORAL 3 TIMES DAILY PRN
Qty: 30 CAPSULE | Refills: 0 | Status: SHIPPED | OUTPATIENT
Start: 2022-12-28 | End: 2023-10-18

## 2022-12-28 ASSESSMENT — ENCOUNTER SYMPTOMS
DIZZINESS: 0
MYALGIAS: 1
WHEEZING: 0
DIAPHORESIS: 0
CHILLS: 1
HEADACHES: 1
SPUTUM PRODUCTION: 0
COUGH: 1
NAUSEA: 0
PALPITATIONS: 0
ABDOMINAL PAIN: 0
SHORTNESS OF BREATH: 0
SINUS PAIN: 0
DIARRHEA: 0
SORE THROAT: 0
FEVER: 1
VOMITING: 0

## 2022-12-28 ASSESSMENT — FIBROSIS 4 INDEX: FIB4 SCORE: 0.85

## 2022-12-28 NOTE — PROGRESS NOTES
Subjective:     CHIEF COMPLAINT  Chief Complaint   Patient presents with    Cough     X3 days       HPI  Carlos Sher is a very pleasant 57 y.o. female who presents to the clinic with flulike symptoms x3 days.  Patient states symptoms started abruptly.  Describes generalized body aches, chills and subjective fever.  She also has cough and congestion.  Cough is dry nonproductive.  No shortness of breath or chest pain.  Denies any GI complaints.  Tolerating oral intake without complication.  Currently alternating Tylenol and ibuprofen as needed for symptoms.  No known ill contacts.    REVIEW OF SYSTEMS  Review of Systems   Constitutional:  Positive for chills, fever and malaise/fatigue. Negative for diaphoresis.   HENT:  Positive for congestion. Negative for ear pain, sinus pain and sore throat.    Respiratory:  Positive for cough. Negative for sputum production, shortness of breath and wheezing.    Cardiovascular:  Negative for chest pain and palpitations.   Gastrointestinal:  Negative for abdominal pain, diarrhea, nausea and vomiting.   Musculoskeletal:  Positive for myalgias.   Neurological:  Positive for headaches. Negative for dizziness.   Endo/Heme/Allergies:  Negative for environmental allergies.     PAST MEDICAL HISTORY  Patient Active Problem List    Diagnosis Date Noted    Hyperlipidemia LDL goal <130 07/12/2016    Cough due to ACE inhibitor 07/12/2016    Seasonal allergies 07/12/2016    Esophageal reflux 05/30/2016    Environmental allergies 05/30/2016    Primary thyroid follicular carcinoma (HCC) 09/02/2011    Vitamin d deficiency 09/02/2011    Recurrent knee pain 07/26/2011    Allergic state        SURGICAL HISTORY   has a past surgical history that includes thyroidectomy total (4/12/10).    ALLERGIES  No Known Allergies    CURRENT MEDICATIONS  Home Medications       Reviewed by Ritchie Barksdale P.A.-C. (Physician Assistant) on 12/28/22 at 1204  Med List Status: <None>     Medication Last Dose Status  "  amLODIPine (NORVASC) 5 MG Tab Taking Active   cholecalciferol (VITAMIN D3) 5000 UNIT Cap Taking Active   diclofenac sodium (VOLTAREN) 1 % Gel  Active   Docusate Sodium (COLACE PO) Taking Active   estradiol (VAGIFEM) 10 MCG Tab Taking Active   levothyroxine (SYNTHROID) 112 MCG Tab Taking Active   MELATONIN PO Taking Active   methylPREDNISolone (MEDROL DOSEPAK) 4 MG Tablet Therapy Pack  Active   Nirmatrelvir & Ritonavir 20 x 150 MG & 10 x 100MG Tablet Therapy Pack  Active                    SOCIAL HISTORY  Social History     Tobacco Use    Smoking status: Never    Smokeless tobacco: Never   Substance and Sexual Activity    Alcohol use: No    Drug use: No    Sexual activity: Not on file       FAMILY HISTORY  Family History   Problem Relation Age of Onset    Hypertension Mother         kidney damage          Objective:     VITAL SIGNS: /66 (BP Location: Left arm, Patient Position: Sitting, BP Cuff Size: Adult)   Pulse 89   Temp 37.2 °C (99 °F) (Temporal)   Resp 16   Ht 1.549 m (5' 1\")   Wt 63.5 kg (140 lb)   SpO2 96%   BMI 26.45 kg/m²     PHYSICAL EXAM  Physical Exam  Constitutional:       General: She is not in acute distress.     Appearance: Normal appearance. She is not ill-appearing, toxic-appearing or diaphoretic.   HENT:      Head: Normocephalic and atraumatic.      Right Ear: Tympanic membrane, ear canal and external ear normal.      Left Ear: Tympanic membrane, ear canal and external ear normal.      Nose: Congestion present. No rhinorrhea.      Mouth/Throat:      Mouth: Mucous membranes are moist.      Pharynx: No oropharyngeal exudate or posterior oropharyngeal erythema.   Eyes:      Conjunctiva/sclera: Conjunctivae normal.   Cardiovascular:      Rate and Rhythm: Normal rate and regular rhythm.      Pulses: Normal pulses.      Heart sounds: Normal heart sounds.   Pulmonary:      Effort: Pulmonary effort is normal.      Breath sounds: Normal breath sounds. No wheezing, rhonchi or rales. "   Musculoskeletal:      Cervical back: Normal range of motion. No muscular tenderness.   Lymphadenopathy:      Cervical: No cervical adenopathy.   Skin:     General: Skin is warm and dry.      Capillary Refill: Capillary refill takes less than 2 seconds.   Neurological:      Mental Status: She is alert.   Psychiatric:         Mood and Affect: Mood normal.         Thought Content: Thought content normal.     Lab Results/POC Test Results   Results for orders placed or performed in visit on 12/28/22   POCT Influenza A/B   Result Value Ref Range    Rapid Influenza A-B Positive A     Internal Control Positive Valid     Internal Control Negative Valid    POCT SARS-COV Antigen PILAR (Symptomatic only)   Result Value Ref Range    Internal  Valid     SARS-COV ANTIGEN PILAR Negative Negative, Indeterminate, None Detected, Not Detected, Detected, NotDetected, Valid, Invalid, Pass    Internal Control Positive Valid     Internal Control Negative Valid              Assessment/Plan:     1. Influenza A  POCT Influenza A/B    POCT SARS-COV Antigen PILAR (Symptomatic only)    benzonatate (TESSALON) 200 MG capsule            MDM/Comments:    -Discussed viral etiology of Influenza.   -Over 48 hours since symptom onset.  No benefit from Tamiflu at this time.    Symptomatic care.  -Oral hydration and rest.   -Over the counter supressant as directed.  -Diphenhydramine as directed for rhinorrhea (runny nose) and sneezing.  -May take over the counter pseudoephedrine for nasal congestion. Can increase your blood pressure.  -Tylenol or ibuprofen for pain and fever as directed. In children, Avoid Aspirin.   -Saline nasal spray as a decongestant.  -Infection control measures at home. Hand washing, covering sneeze/cough.  -Remain home from work, school, and other populated environments until at least 24 hours after you no longer have a fever.     Discussed associated complications, including risk of pneumonia and ear infections. Follow  up with primary care provider. Follow up urgently for worsening symptoms, ear pain or drainage, shortness of breath, abdominal pain, or any other concerns. Follow up emergently for trouble breathing, elevated heart rate, chest pain, signs of dehydration, dizziness, weakness, decreased urine output, confusion, persistent vomiting, severe headache, neck stiffness, persistent high grade fever.      Differential diagnosis, natural history, supportive care, and indications for immediate follow-up discussed. All questions answered. Patient agrees with the plan of care.    Follow-up as needed if symptoms worsen or fail to improve to PCP, Urgent care or Emergency Room.    I have personally reviewed prior external notes and test results pertinent to today's visit.  I have independently reviewed and interpreted all diagnostics ordered during this urgent care acute visit.   Discussed management options (risks,benefits, and alternatives to treatment). Pt expresses understanding and the treatment plan was agreed upon. Questions were encouraged and answered to pt's satisfaction.    Please note that this dictation was created using voice recognition software. I have made a reasonable attempt to correct obvious errors, but I expect that there are errors of grammar and possibly content that I did not discover before finalizing the note.

## 2022-12-28 NOTE — LETTER
[facility]  [address]  [place] [address]     December 28, 2022    Patient: Carlos Sher   YOB: 1965   Date of Visit: 12/28/2022       To Whom It May Concern:    Carlos Sher was seen and treated in our department on 12/28/2022.  Patient tested positive for influenza A.  Please excuse her absence from work on 12/28/2022 through 12/30/2022.    Sincerely,     Ritchie Barksdale P.A.-C.

## 2023-06-03 ENCOUNTER — HOSPITAL ENCOUNTER (EMERGENCY)
Facility: MEDICAL CENTER | Age: 58
End: 2023-06-04
Attending: EMERGENCY MEDICINE
Payer: COMMERCIAL

## 2023-06-03 DIAGNOSIS — R01.1 HEART MURMUR: ICD-10-CM

## 2023-06-03 DIAGNOSIS — R42 DIZZINESS: Primary | ICD-10-CM

## 2023-06-03 PROCEDURE — 99284 EMERGENCY DEPT VISIT MOD MDM: CPT

## 2023-06-03 ASSESSMENT — FIBROSIS 4 INDEX: FIB4 SCORE: 0.85

## 2023-06-03 NOTE — Clinical Note
Carlos Sher was seen and treated in our emergency department on 6/3/2023.  She may return to work on 06/07/2023.       If you have any questions or concerns, please don't hesitate to call.      John Paul Covarrubias II, M.D.

## 2023-06-04 ENCOUNTER — APPOINTMENT (OUTPATIENT)
Dept: RADIOLOGY | Facility: MEDICAL CENTER | Age: 58
End: 2023-06-04
Attending: EMERGENCY MEDICINE
Payer: COMMERCIAL

## 2023-06-04 VITALS
SYSTOLIC BLOOD PRESSURE: 148 MMHG | HEIGHT: 61 IN | WEIGHT: 140 LBS | RESPIRATION RATE: 16 BRPM | BODY MASS INDEX: 26.43 KG/M2 | HEART RATE: 66 BPM | TEMPERATURE: 97.3 F | DIASTOLIC BLOOD PRESSURE: 71 MMHG | OXYGEN SATURATION: 97 %

## 2023-06-04 LAB
ALBUMIN SERPL BCP-MCNC: 4 G/DL (ref 3.2–4.9)
ALBUMIN/GLOB SERPL: 1.3 G/DL
ALP SERPL-CCNC: 82 U/L (ref 30–99)
ALT SERPL-CCNC: 19 U/L (ref 2–50)
ANION GAP SERPL CALC-SCNC: 15 MMOL/L (ref 7–16)
AST SERPL-CCNC: 23 U/L (ref 12–45)
BASOPHILS # BLD AUTO: 0.6 % (ref 0–1.8)
BASOPHILS # BLD: 0.07 K/UL (ref 0–0.12)
BILIRUB SERPL-MCNC: 0.5 MG/DL (ref 0.1–1.5)
BUN SERPL-MCNC: 14 MG/DL (ref 8–22)
CALCIUM ALBUM COR SERPL-MCNC: 9.1 MG/DL (ref 8.5–10.5)
CALCIUM SERPL-MCNC: 9.1 MG/DL (ref 8.5–10.5)
CHLORIDE SERPL-SCNC: 105 MMOL/L (ref 96–112)
CO2 SERPL-SCNC: 21 MMOL/L (ref 20–33)
CREAT SERPL-MCNC: 0.73 MG/DL (ref 0.5–1.4)
EKG IMPRESSION: NORMAL
EOSINOPHIL # BLD AUTO: 0.17 K/UL (ref 0–0.51)
EOSINOPHIL NFR BLD: 1.4 % (ref 0–6.9)
ERYTHROCYTE [DISTWIDTH] IN BLOOD BY AUTOMATED COUNT: 34.2 FL (ref 35.9–50)
GFR SERPLBLD CREATININE-BSD FMLA CKD-EPI: 95 ML/MIN/1.73 M 2
GLOBULIN SER CALC-MCNC: 3.1 G/DL (ref 1.9–3.5)
GLUCOSE SERPL-MCNC: 125 MG/DL (ref 65–99)
HCT VFR BLD AUTO: 35.5 % (ref 37–47)
HGB BLD-MCNC: 11.8 G/DL (ref 12–16)
IMM GRANULOCYTES # BLD AUTO: 0.06 K/UL (ref 0–0.11)
IMM GRANULOCYTES NFR BLD AUTO: 0.5 % (ref 0–0.9)
LYMPHOCYTES # BLD AUTO: 1.55 K/UL (ref 1–4.8)
LYMPHOCYTES NFR BLD: 13.2 % (ref 22–41)
MAGNESIUM SERPL-MCNC: 1.8 MG/DL (ref 1.5–2.5)
MCH RBC QN AUTO: 24.5 PG (ref 27–33)
MCHC RBC AUTO-ENTMCNC: 33.2 G/DL (ref 32.2–35.5)
MCV RBC AUTO: 73.8 FL (ref 81.4–97.8)
MONOCYTES # BLD AUTO: 0.59 K/UL (ref 0–0.85)
MONOCYTES NFR BLD AUTO: 5 % (ref 0–13.4)
NEUTROPHILS # BLD AUTO: 9.33 K/UL (ref 1.82–7.42)
NEUTROPHILS NFR BLD: 79.3 % (ref 44–72)
NRBC # BLD AUTO: 0 K/UL
NRBC BLD-RTO: 0 /100 WBC (ref 0–0.2)
PLATELET # BLD AUTO: 291 K/UL (ref 164–446)
PMV BLD AUTO: 9.7 FL (ref 9–12.9)
POTASSIUM SERPL-SCNC: 3.5 MMOL/L (ref 3.6–5.5)
PROT SERPL-MCNC: 7.1 G/DL (ref 6–8.2)
RBC # BLD AUTO: 4.81 M/UL (ref 4.2–5.4)
SODIUM SERPL-SCNC: 141 MMOL/L (ref 135–145)
TROPONIN T SERPL-MCNC: 8 NG/L (ref 6–19)
WBC # BLD AUTO: 11.8 K/UL (ref 4.8–10.8)

## 2023-06-04 PROCEDURE — 71045 X-RAY EXAM CHEST 1 VIEW: CPT

## 2023-06-04 PROCEDURE — 85025 COMPLETE CBC W/AUTO DIFF WBC: CPT

## 2023-06-04 PROCEDURE — 84484 ASSAY OF TROPONIN QUANT: CPT

## 2023-06-04 PROCEDURE — 93005 ELECTROCARDIOGRAM TRACING: CPT | Performed by: EMERGENCY MEDICINE

## 2023-06-04 PROCEDURE — 83735 ASSAY OF MAGNESIUM: CPT

## 2023-06-04 PROCEDURE — 36415 COLL VENOUS BLD VENIPUNCTURE: CPT

## 2023-06-04 PROCEDURE — 80053 COMPREHEN METABOLIC PANEL: CPT

## 2023-06-04 NOTE — ED PROVIDER NOTES
ED Provider Note    CHIEF COMPLAINT  Chief Complaint   Patient presents with    Syncope     BIBA from a wedding for near syncope   Pt states shortly after eating she began to feel very dizzy, +n/v  When she stood to get into EMS gurney she became very pale and again had a near syncope  Denies chest pain       EXTERNAL RECORDS REVIEWED  No past admissions    HPI/ROS  LIMITATION TO HISTORY   Select: : None  OUTSIDE HISTORIAN(S):  Family  and daughter    Carlos Sher is a 57 y.o. female with history of hypertension who presents after having episode of sudden dizziness while sitting, then she had vomiting.  EMS was called. When she stool up to get on EMS gurney she started to feel very dizzy again. Never lost consciousness. No chest pain. No palpitations. No shortness of breath.  No abdominal pain. Asymptomatic now.       She remembers having one syncope episode 30+ years ago.     PAST MEDICAL HISTORY   has a past medical history of Anemia, Constipation (2008), Cough due to ACE inhibitor (7/12/2016), GERD (gastroesophageal reflux disease), Seasonal allergies, Thyroid ca (HCC) (2010), and Vitamin d deficiency (9/2/2011).    SURGICAL HISTORY   has a past surgical history that includes thyroidectomy total (4/12/10).    FAMILY HISTORY  Family History   Problem Relation Age of Onset    Hypertension Mother         kidney damage       SOCIAL HISTORY  Social History     Tobacco Use    Smoking status: Never    Smokeless tobacco: Never   Substance and Sexual Activity    Alcohol use: No    Drug use: No    Sexual activity: Not on file       CURRENT MEDICATIONS  Home Medications       Reviewed by Judd Medeiros R.N. (Registered Nurse) on 06/04/23 at 0001  Med List Status: Not Addressed     Medication Last Dose Status   amLODIPine (NORVASC) 5 MG Tab  Active   benzonatate (TESSALON) 200 MG capsule  Active   cholecalciferol (VITAMIN D3) 5000 UNIT Cap  Active   diclofenac sodium (VOLTAREN) 1 % Gel  Active   Docusate Sodium  "(COLACE PO)  Active   estradiol (VAGIFEM) 10 MCG Tab  Active   levothyroxine (SYNTHROID) 112 MCG Tab  Active   MELATONIN PO  Active   methylPREDNISolone (MEDROL DOSEPAK) 4 MG Tablet Therapy Pack  Active   Nirmatrelvir & Ritonavir 20 x 150 MG & 10 x 100MG Tablet Therapy Pack  Active                    ALLERGIES  No Known Allergies    PHYSICAL EXAM  VITAL SIGNS: BP (!) 148/71   Pulse 66   Temp 36.3 °C (97.3 °F) (Temporal)   Resp 16   Ht 1.549 m (5' 1\")   Wt 63.5 kg (140 lb)   SpO2 97%   BMI 26.45 kg/m²    Physical Exam  Vitals and nursing note reviewed.   Constitutional:       Appearance: Normal appearance.   Cardiovascular:      Rate and Rhythm: Normal rate and regular rhythm.      Heart sounds: Murmur (systolic murmur) heard.   Pulmonary:      Effort: Pulmonary effort is normal.      Breath sounds: Normal breath sounds.   Abdominal:      General: There is no distension.      Tenderness: There is no abdominal tenderness.   Neurological:      Mental Status: She is alert.      Comments: Alert and oriented person place time situation.  No facial droop.  Clear speech.  No aphasia normal eye movements without nystagmus.  No extremity weakness or drift.  Normal coordination.  No ataxia.   Psychiatric:         Mood and Affect: Mood normal.           DIAGNOSTIC STUDIES / PROCEDURES  EKG  I have independently interpreted this EKG  See below    LABS  Results for orders placed or performed during the hospital encounter of 06/03/23   CBC WITH DIFFERENTIAL   Result Value Ref Range    WBC 11.8 (H) 4.8 - 10.8 K/uL    RBC 4.81 4.20 - 5.40 M/uL    Hemoglobin 11.8 (L) 12.0 - 16.0 g/dL    Hematocrit 35.5 (L) 37.0 - 47.0 %    MCV 73.8 (L) 81.4 - 97.8 fL    MCH 24.5 (L) 27.0 - 33.0 pg    MCHC 33.2 32.2 - 35.5 g/dL    RDW 34.2 (L) 35.9 - 50.0 fL    Platelet Count 291 164 - 446 K/uL    MPV 9.7 9.0 - 12.9 fL    Neutrophils-Polys 79.30 (H) 44.00 - 72.00 %    Lymphocytes 13.20 (L) 22.00 - 41.00 %    Monocytes 5.00 0.00 - 13.40 %    " Eosinophils 1.40 0.00 - 6.90 %    Basophils 0.60 0.00 - 1.80 %    Immature Granulocytes 0.50 0.00 - 0.90 %    Nucleated RBC 0.00 0.00 - 0.20 /100 WBC    Neutrophils (Absolute) 9.33 (H) 1.82 - 7.42 K/uL    Lymphs (Absolute) 1.55 1.00 - 4.80 K/uL    Monos (Absolute) 0.59 0.00 - 0.85 K/uL    Eos (Absolute) 0.17 0.00 - 0.51 K/uL    Baso (Absolute) 0.07 0.00 - 0.12 K/uL    Immature Granulocytes (abs) 0.06 0.00 - 0.11 K/uL    NRBC (Absolute) 0.00 K/uL   COMP METABOLIC PANEL   Result Value Ref Range    Sodium 141 135 - 145 mmol/L    Potassium 3.5 (L) 3.6 - 5.5 mmol/L    Chloride 105 96 - 112 mmol/L    Co2 21 20 - 33 mmol/L    Anion Gap 15.0 7.0 - 16.0    Glucose 125 (H) 65 - 99 mg/dL    Bun 14 8 - 22 mg/dL    Creatinine 0.73 0.50 - 1.40 mg/dL    Calcium 9.1 8.5 - 10.5 mg/dL    AST(SGOT) 23 12 - 45 U/L    ALT(SGPT) 19 2 - 50 U/L    Alkaline Phosphatase 82 30 - 99 U/L    Total Bilirubin 0.5 0.1 - 1.5 mg/dL    Albumin 4.0 3.2 - 4.9 g/dL    Total Protein 7.1 6.0 - 8.2 g/dL    Globulin 3.1 1.9 - 3.5 g/dL    A-G Ratio 1.3 g/dL   TROPONIN   Result Value Ref Range    Troponin T 8 6 - 19 ng/L   MAGNESIUM   Result Value Ref Range    Magnesium 1.8 1.5 - 2.5 mg/dL   ESTIMATED GFR   Result Value Ref Range    GFR (CKD-EPI) 95 >60 mL/min/1.73 m 2   CORRECTED CALCIUM   Result Value Ref Range    Correct Calcium 9.1 8.5 - 10.5 mg/dL   EKG   Result Value Ref Range    Report       [facility]    Test Date:  2023  Pt Name:    ERICK CORRIGAN               Department: ER  MRN:        [id number]                      Room:       BL 17  Gender:     Female                       Technician: [id number]  :        1965                   Requested By:ER TRIAGE PROTOCOL  Order #:    [id number]                    Reading MD: John Paul Covarrubias II, MD    Measurements  Intervals                                Axis  Rate:       66                           P:          48  TX:         169                          QRS:         11  QRSD:       94                           T:          23  QT:         413  QTc:        433    Interpretive Statements  Sinus rhythm  Rate 66  Normal intervals  No ST elevation or depression. Normal twaves.  Impression: Normal sinus rhythm EKG  No previous ECG available for comparison  Electronically Signed On 6-4-2023 0:27:35 PDT by John Paul Covarrubias II, MD           RADIOLOGY  I have independently interpreted the diagnostic imaging associated with this visit and am waiting the final reading from the radiologist.   My preliminary interpretation is as follows: Clear lung fields, no widened mediastinum.  Radiologist interpretation:   DX-CHEST-PORTABLE (1 VIEW)   Final Result      No acute cardiopulmonary abnormality.               COURSE & MEDICAL DECISION MAKING    ED Observation Status? Yes; I am placing the patient in to an observation status due to a diagnostic uncertainty as well as therapeutic intensity. Patient placed in observation status at 12:57 AM, 6/4/2023.     Observation plan is as follows: serum studies, cardiac monitoring, serial exams    Upon Reevaluation, the patient's condition has: Improved; and will be discharged.    Patient discharged from ED Observation status at 02:38 6/4/23    INITIAL ASSESSMENT, COURSE AND PLAN  Care Narrative: This is an emergent evaluation of a 57-year-old woman with history of hypertension who was at a wedding, sitting down when she developed lightheadedness.  She then had vomiting and nearly lost consciousness when moving to [facility].  Symptoms resolved by time of arrival to the emergency department.  There was no provoking event such as change in position, pain, or emotional response.  She also denies any palpitations, chest pain or shortness of breath.  Her exam here in the ER is unremarkable besides a heart murmur which is previously known but I have no results on prior echo.  Plan for work-up of syncope which will include EKG (normal), chest x-ray, CBC, CMP,  troponin (to help complete [place] syncope rule).     1:50 AM  Labs revealed a mild microcytic anemia.  Troponin normal.  Borderline low potassium at 3.5.  Glucose normal.  Metabolic panel otherwise unremarkable.  Chest x-ray showed no cardiac enlargement or widened mediastinum.  Lung fields are clear.  She was reevaluated at bedside.  She is asymptomatic.  I reviewed her presentation with [place] syncope rules and the [place] syncope risk score.  She has low risk.  She is requesting to be discharged and I believe this is reasonable.  I would like her to follow-up with cardiology clinic because she does have a heart murmur and has not had a echo of her heart for the past few years.  She has had episodes of dizziness before.  I placed a referral to the cardiology clinic and asked her to call to make an appointment on Monday.  She was able to tolerate oral fluid and ambulate without difficulty here in the ER before discharge.       PROBLEM LIST  #Dizziness with presyncopal symptoms   - unclear etiology or prognosis   - labs reassuring, no abnormalities on EKG, CXR, or monitoring   -cardiology follow up b/c of heart murmur.    #Heart murmur   -previously known, would like her to follow up with cardiology for further evaluation    DISPOSITION AND DISCUSSIONS      Escalation of care considered, and ultimately not performed:acute inpatient care management, however at this time, the patient is most appropriate for outpatient management    Barriers to care at this time, including but not limited to: Patient does not have established PCP.     Decision tools and prescription drugs considered including, but not limited to: [place] syncope--not high risk, [place] syncope --not high risk.    FINAL DIAGNOSIS  1. Dizziness    2. Heart murmur           Electronically signed by: John Paul Covarrubias II, M.D., 6/4/2023 12:10 AM

## 2023-06-04 NOTE — ED NOTES
Bedside report received from previous shift. Assumed patient care. Verified patient identification. Checked on bed, connected to monitor,  with unlabored respirations. Discussed plan of care. Vital signs is stable. Denied any new complaints. Gurney in low position, side rail up for pt safety. Call light within reach. Will continue to monitor for any complications.     Alert and Oriented x 4

## 2023-06-04 NOTE — ED TRIAGE NOTES
"Chief Complaint   Patient presents with    Syncope     BIBA from a wedding for near syncope   Pt states shortly after eating she began to feel very dizzy, +n/v  When she stood to get into EMS gurney she became very pale and again had a near syncope  Denies chest pain     BP (!) 157/76   Pulse 81   Temp 36.8 °C (98.2 °F) (Temporal)   Resp 18   Ht 1.549 m (5' 1\")   Wt 63.5 kg (140 lb)   SpO2 99%   BMI 26.45 kg/m²     "

## 2023-06-04 NOTE — ED NOTES
Pt discharged to home. Discharge paperwork provided. Education provided by ERP.  Pt was given follow up instructions   Pt verbalized understanding of all instructions for discharge. Patient ambulatory, alert and oriented x 4, out of ER with all belongings and steady gait accompanied by pt family

## 2023-06-08 ENCOUNTER — TELEPHONE (OUTPATIENT)
Dept: HEALTH INFORMATION MANAGEMENT | Facility: OTHER | Age: 58
End: 2023-06-08

## 2023-07-28 ENCOUNTER — OFFICE VISIT (OUTPATIENT)
Dept: URGENT CARE | Facility: CLINIC | Age: 58
End: 2023-07-28
Payer: COMMERCIAL

## 2023-07-28 VITALS
RESPIRATION RATE: 16 BRPM | HEIGHT: 61 IN | SYSTOLIC BLOOD PRESSURE: 120 MMHG | DIASTOLIC BLOOD PRESSURE: 60 MMHG | HEART RATE: 75 BPM | OXYGEN SATURATION: 99 % | BODY MASS INDEX: 26.43 KG/M2 | WEIGHT: 140 LBS | TEMPERATURE: 99.4 F

## 2023-07-28 DIAGNOSIS — R68.89 FLU-LIKE SYMPTOMS: Primary | ICD-10-CM

## 2023-07-28 DIAGNOSIS — U07.1 COVID-19: ICD-10-CM

## 2023-07-28 LAB
FLUAV RNA SPEC QL NAA+PROBE: NEGATIVE
FLUBV RNA SPEC QL NAA+PROBE: NEGATIVE
RSV RNA SPEC QL NAA+PROBE: NEGATIVE
SARS-COV-2 RNA RESP QL NAA+PROBE: POSITIVE

## 2023-07-28 PROCEDURE — 3074F SYST BP LT 130 MM HG: CPT

## 2023-07-28 PROCEDURE — 99213 OFFICE O/P EST LOW 20 MIN: CPT

## 2023-07-28 PROCEDURE — 0241U POCT CEPHEID COV-2, FLU A/B, RSV - PCR: CPT

## 2023-07-28 PROCEDURE — 3078F DIAST BP <80 MM HG: CPT

## 2023-07-28 ASSESSMENT — FIBROSIS 4 INDEX: FIB4 SCORE: 1.05

## 2023-07-28 NOTE — LETTER
7/28/2023               Carlos Sher  [address] [place] [id number]        Dear Carlos (MR#[id number]),    This letter is to inform you that your COVID-19 test result is POSITIVE.  This means that the virus that causes COVID-19 was found in your sample.      SARS-CoV-2 Source   Date Value Ref Range Status   08/02/2022 Nasal Swab  Final     SARS-CoV-2 by PCR   Date Value Ref Range Status   07/28/2023 Positive (A) Negative, Invalid Final     SARS-COV ANTIGEN PILAR   Date Value Ref Range Status   12/28/2022 Negative Negative, Indeterminate, None Detected, Not Detected, Detected, NotDetected, Valid, Invalid, Pass Final     Internal    Date Value Ref Range Status   12/28/2022 Valid  Final       If your symptoms are generally mild and stable, please isolate yourself at home. If it becomes difficult to breathe, contact your healthcare provider as soon as possible.    Next steps:  -  Stay home except to get medical care.  -  Follow the instructions for home isolation below.  -  Call ahead before visiting your healthcare provider or a hospital.  -  Go to the nearest emergency department for worsening symptoms including difficulty breathing, ongoing fever, weakness or chest pain.    You should isolate yourself:  -  For a minimum of 5 days from symptom onset or positive test and  -  At least 24 hours have passed since your last fever without the use of fever-reducing medications and  -  All other symptoms have improved (loss of taste and smell may persist for weeks or months after recovery and should not delay the end of isolation)    Instructions for Self-Isolation at Home:  -  We recommend you stay in your home and minimize contact with others to avoid spreading this infection.  -  Do not go to work, school or public areas unless otherwise directed by the health department. Avoid using public transportation, ridesharing or taxis.  -  Separate yourself from other people in your home as much as possible.  -   Stay in a specific room and away from other people in your home as much as possible. Use a separate bathroom if possible.        When seeking care at a healthcare facility:  -  Seek prompt medical attention if your illness is worsening (e.g., difficulty breathing).  -  When possible, call the healthcare provider before arriving.  -  Put on a facemask before you enter the facility.  -  If possible, put on a facemask before the ambulance or paramedics arrive.  -  These steps will help the healthcare provider's office prevent other people from getting infected or exposed.    Once any symptoms have resolved, it may be possible to donate plasma to help others that are currently ill with COVID-19. To learn more and apply, please contact the  at [phone] or via e-mail at [email or web address].    For any further questions regarding COVID-19, please contact your Count includes the [facility]'s health department or healthcare provider.        Sincerely,    [facility]

## 2023-07-28 NOTE — LETTER
July 30, 2023    To Whom It May Concern:         This is confirmation that Carlos Sher attended her scheduled appointment with SARAHI Jefferson on 7/28/23. Her COVID-19 PCR test is POSITIVE.    She  should isolate herself:  -  For a minimum of 5 days from symptom onset (Wednesday 07/26/2023) or positive test and  -  At least 24 hours have passed since your last fever without the use of fever-reducing medications and  -  All other symptoms have improved (loss of taste and smell may persist for weeks or months after recovery and should not delay the end of isolation)    She may return to work on 07/31/2023 if the above criteria are met.    Instructions for Self-Isolation at Home:  -  We recommend you stay in your home and minimize contact with others to avoid spreading this infection.  -  Do not go to work, school or public areas unless otherwise directed by the health department. Avoid using public transportation, ridesharing or taxis.  -  Separate yourself from other people in your home as much as possible.  -  Stay in a specific room and away from other people in your home as much as possible. Use a separate bathroom if possible.         If you have any questions please do not hesitate to call me at the phone number listed below.    Sincerely,          MENDEZ Jefferson.  [phone]

## 2023-07-28 NOTE — LETTER
"7/28/2023               Carlos Sher  [address] [place] [id number]        Dear Carlos (MR#[id number]),    This letter is to inform you that your COVID-19 test result is POSITIVE.  This means that the virus that causes COVID-19 was found in your sample.      SARS-CoV-2 Source   Date Value Ref Range Status   08/02/2022 Nasal Swab  Final     SARS-CoV-2 by PCR   Date Value Ref Range Status   08/02/2022 DETECTED (AA)  Final     Comment:     PATIENTS: Important information regarding your results and instructions can  be found at [email or web address]   \"After your  Covid-19 Test\"    **The Roche SARS-CoV-2 RT-PCR test has been made available for use under the  Emergency Use Authorization (EUA) only.       SARS-COV ANTIGEN PILAR   Date Value Ref Range Status   12/28/2022 Negative Negative, Indeterminate, None Detected, Not Detected, Detected, NotDetected, Valid, Invalid, Pass Final     Internal    Date Value Ref Range Status   12/28/2022 Valid  Final       If your symptoms are generally mild and stable, please isolate yourself at home. If it becomes difficult to breathe, contact your healthcare provider as soon as possible.    Next steps:  -  Stay home except to get medical care.  -  Follow the instructions for home isolation below.  -  Call ahead before visiting your healthcare provider or a hospital.  -  Go to the nearest emergency department for worsening symptoms including difficulty breathing, ongoing fever, weakness or chest pain.    You should isolate yourself:  -  For a minimum of 5 days from symptom onset or positive test and  -  At least 24 hours have passed since your last fever without the use of fever-reducing medications and  -  All other symptoms have improved (loss of taste and smell may persist for weeks or months after recovery and should not delay the end of isolation)    Instructions for Self-Isolation at Home:  -  We recommend you stay in your home and " minimize contact with others to avoid spreading this infection.  -  Do not go to work, school or public areas unless otherwise directed by the health department. Avoid using public transportation, ridesharing or taxis.  -  Separate yourself from other people in your home as much as possible.  -  Stay in a specific room and away from other people in your home as much as possible. Use a separate bathroom if possible.        When seeking care at a healthcare facility:  -  Seek prompt medical attention if your illness is worsening (e.g., difficulty breathing).  -  When possible, call the healthcare provider before arriving.  -  Put on a facemask before you enter the facility.  -  If possible, put on a facemask before the ambulance or paramedics arrive.  -  These steps will help the healthcare provider's office prevent other people from getting infected or exposed.    Once any symptoms have resolved, it may be possible to donate plasma to help others that are currently ill with COVID-19. To learn more and apply, please contact the  at [phone] or via e-mail at [email or web address].    For any further questions regarding COVID-19, please contact your [facility]'s health department or healthcare provider.        Sincerely,    [facility]

## 2023-07-29 NOTE — PROGRESS NOTES
Subjective:   Carlos Sher is a 57 y.o. female who presents for Coronavirus Screening (Tested positive for covid yesterday with a home test)      HPI: Nikky reports that her onset of symptoms was on Wednesday of this week on 7/26/2023.  She developed a sore throat, cough, and a fever with T-max of 100.8F.  She reports that her  is also sick with the same symptoms.  They did do a home COVID test yesterday which came out positive.  She states her symptoms are constant.  No aggravating or relieving factors identified.  She has been taking Tylenol at home and states that she took her last dose about 2 hours before coming in to urgent care today.  She says this did help alleviate her symptoms.  She describes her symptoms as mild.  She wants to get a PCR COVID test done, and get a note for her employer saying that she can isolate.  She works at the [employer].  She states she does not want to be  treated with Paxlovid should she be positive on the PCR.  She is vaccinated against COVID x4.    Review of Systems   Constitutional:  Positive for chills, fever and malaise/fatigue.   HENT:  Positive for congestion and sore throat. Negative for ear discharge, ear pain and hearing loss.    Eyes:  Negative for blurred vision, pain, discharge and redness.   Respiratory:  Positive for cough. Negative for sputum production, shortness of breath and wheezing.    Cardiovascular:  Negative for chest pain, palpitations and leg swelling.   Gastrointestinal:  Negative for heartburn, nausea and vomiting.   Genitourinary:  Negative for dysuria.   Musculoskeletal:  Positive for myalgias.   Skin:  Negative for itching and rash.   Neurological:  Negative for dizziness and headaches.   Endo/Heme/Allergies:  Does not bruise/bleed easily.   Psychiatric/Behavioral:  Negative for depression. The patient is not nervous/anxious.        Medications:    amLODIPine Tabs  benzonatate  cholecalciferol Caps  COLACE PO  diclofenac sodium  "Gel  estradiol Tabs  levothyroxine Tabs  MELATONIN PO  methylPREDNISolone Tbpk    Allergies: Patient has no known allergies.    Problem List: Carlos Sher does not have any pertinent problems on file.    Surgical History:  Past Surgical History:   Procedure Laterality Date    THYROIDECTOMY TOTAL  4/12/10    Thyroid cancer, followed by radiation.       Past Social Hx: Carlos Sher  reports that she has never smoked. She has never used smokeless tobacco. She reports that she does not drink alcohol and does not use drugs.     Past Family Hx:  Carlos Sher family history includes Hypertension in her mother.     Problem list, medications, and allergies reviewed by myself today in Epic.     Objective:     /60 (BP Location: Left arm, Patient Position: Sitting, BP Cuff Size: Adult)   Pulse 75   Temp 37.4 °C (99.4 °F) (Temporal)   Resp 16   Ht 1.549 m (5' 1\")   Wt 63.5 kg (140 lb)   SpO2 99%   BMI 26.45 kg/m²     Physical Exam  Vitals reviewed.   Constitutional:       Appearance: Normal appearance.   HENT:      Head: Normocephalic and atraumatic.      Right Ear: External ear normal.      Left Ear: External ear normal.      Nose: Congestion present.      Mouth/Throat:      Mouth: Mucous membranes are moist.      Pharynx: Posterior oropharyngeal erythema present.   Eyes:      General: No scleral icterus.     Conjunctiva/sclera: Conjunctivae normal.   Cardiovascular:      Rate and Rhythm: Normal rate and regular rhythm.      Heart sounds: Murmur heard.      Comments: A systolic murmur is audible, loudest at the aortic auscultation point.  Patient states she has had this for a long time.  Pulmonary:      Effort: Pulmonary effort is normal.      Breath sounds: No wheezing, rhonchi or rales.   Abdominal:      General: There is no distension.   Musculoskeletal:         General: Normal range of motion.      Cervical back: Normal range of motion.      Right lower leg: No edema.      Left lower leg: No edema. "   Lymphadenopathy:      Cervical: No cervical adenopathy.   Skin:     General: Skin is warm and dry.   Neurological:      Mental Status: She is alert and oriented to person, place, and time.   Psychiatric:         Mood and Affect: Mood normal.         Behavior: Behavior normal.         Assessment/Plan:     Diagnosis and associated orders:     No diagnosis found.   Comments/MDM:     1. Flu-like symptoms  Home Covid test was positive. She wants a PCR and note for work.   - POCT CEPHEID COV-2, FLU A/B, RSV - PCR    2. COVID-19  COVID test is positive which would explain her symptoms.  She is currently afebrile.  She is vaccine against COVID x4.  She has a PMH of primary thyroid follicular carcinoma and hypertension; PMH makes her high risk for severe COVID therefore I recommended she start on paxlovid.  She refused and she did not want to take the Paxlovid, in fact she was prescribed it last year but did not take it.  Risk,  benefits were discussed.  Patient would like to wait a couple more days to see if symptoms improve, and will return to urgent care if they do not.  She was instructed she has to begin the Paxlovid within 5 days if she decides to do so.  No drug to drug interactions.  She has good kidney function.  COVID PCR test was ordered and results were [facility] to [facility] as well as a letter for her employer with instructions regarding how long to isolate and wear a mask according to CDC guidelines.         Differential diagnosis, natural history, supportive care, and indications for immediate follow-up discussed.    Advised the patient to follow-up with the primary care physician for recheck, reevaluation, and consideration of further management.    Please note that this dictation was created using voice recognition software. I have made a reasonable attempt to correct obvious errors, but I expect that there are errors of grammar and possibly content that I did not discover before finalizing the note.    This  note was electronically signed by SARAHI Jefferson

## 2023-07-30 ASSESSMENT — ENCOUNTER SYMPTOMS
MYALGIAS: 1
BLURRED VISION: 0
SORE THROAT: 1
PALPITATIONS: 0
COUGH: 1
EYE REDNESS: 0
DEPRESSION: 0
EYE DISCHARGE: 0
FEVER: 1
CHILLS: 1
NERVOUS/ANXIOUS: 0
DIZZINESS: 0
SPUTUM PRODUCTION: 0
BRUISES/BLEEDS EASILY: 0
SHORTNESS OF BREATH: 0
WHEEZING: 0
HEADACHES: 0
NAUSEA: 0
VOMITING: 0
EYE PAIN: 0
HEARTBURN: 0

## 2023-10-18 ENCOUNTER — OFFICE VISIT (OUTPATIENT)
Dept: URGENT CARE | Facility: CLINIC | Age: 58
End: 2023-10-18
Payer: COMMERCIAL

## 2023-10-18 VITALS
BODY MASS INDEX: 26.43 KG/M2 | WEIGHT: 140 LBS | HEART RATE: 71 BPM | SYSTOLIC BLOOD PRESSURE: 114 MMHG | RESPIRATION RATE: 16 BRPM | OXYGEN SATURATION: 98 % | TEMPERATURE: 97.8 F | HEIGHT: 61 IN | DIASTOLIC BLOOD PRESSURE: 60 MMHG

## 2023-10-18 DIAGNOSIS — J02.0 STREP PHARYNGITIS: ICD-10-CM

## 2023-10-18 LAB — S PYO DNA SPEC NAA+PROBE: DETECTED

## 2023-10-18 PROCEDURE — 3074F SYST BP LT 130 MM HG: CPT | Performed by: PHYSICIAN ASSISTANT

## 2023-10-18 PROCEDURE — 99213 OFFICE O/P EST LOW 20 MIN: CPT | Performed by: PHYSICIAN ASSISTANT

## 2023-10-18 PROCEDURE — 3078F DIAST BP <80 MM HG: CPT | Performed by: PHYSICIAN ASSISTANT

## 2023-10-18 PROCEDURE — 87651 STREP A DNA AMP PROBE: CPT | Performed by: PHYSICIAN ASSISTANT

## 2023-10-18 RX ORDER — AMOXICILLIN 500 MG/1
500 CAPSULE ORAL 2 TIMES DAILY
Qty: 20 CAPSULE | Refills: 0 | Status: SHIPPED | OUTPATIENT
Start: 2023-10-18 | End: 2023-10-28

## 2023-10-18 ASSESSMENT — FIBROSIS 4 INDEX: FIB4 SCORE: 1.05

## 2023-10-18 NOTE — LETTER
October 18, 2023         Patient: Carlos Sher   YOB: 1965   Date of Visit: 10/18/2023           To Whom it May Concern:    Carlos Sher was seen in my clinic on 10/18/2023. She should be excused from work from 10/16/23-10/20/23 due to medical illness.     If you have any questions or concerns, please don't hesitate to call.        Sincerely,           Alysia Dumont P.A.-C.  Electronically Signed

## 2023-10-23 ASSESSMENT — ENCOUNTER SYMPTOMS
HEADACHES: 0
COUGH: 0
CHILLS: 1
ABDOMINAL PAIN: 0
FEVER: 1
VOMITING: 0
SPUTUM PRODUCTION: 0
SHORTNESS OF BREATH: 0
MYALGIAS: 1
NAUSEA: 0
WHEEZING: 0
SORE THROAT: 1